# Patient Record
Sex: FEMALE | Race: OTHER | Employment: FULL TIME | URBAN - METROPOLITAN AREA
[De-identification: names, ages, dates, MRNs, and addresses within clinical notes are randomized per-mention and may not be internally consistent; named-entity substitution may affect disease eponyms.]

---

## 2017-08-01 ENCOUNTER — APPOINTMENT (OUTPATIENT)
Dept: CT IMAGING | Age: 32
End: 2017-08-01
Attending: PHYSICIAN ASSISTANT
Payer: OTHER GOVERNMENT

## 2017-08-01 ENCOUNTER — HOSPITAL ENCOUNTER (EMERGENCY)
Age: 32
Discharge: HOME | End: 2017-08-01
Attending: EMERGENCY MEDICINE
Payer: OTHER GOVERNMENT

## 2017-08-01 VITALS
WEIGHT: 126 LBS | BODY MASS INDEX: 22.32 KG/M2 | DIASTOLIC BLOOD PRESSURE: 104 MMHG | SYSTOLIC BLOOD PRESSURE: 163 MMHG | OXYGEN SATURATION: 100 % | HEIGHT: 63 IN | TEMPERATURE: 98.2 F | RESPIRATION RATE: 11 BRPM | HEART RATE: 64 BPM

## 2017-08-01 DIAGNOSIS — S09.90XA HEAD INJURY, INITIAL ENCOUNTER: Primary | ICD-10-CM

## 2017-08-01 DIAGNOSIS — I10 ESSENTIAL HYPERTENSION: ICD-10-CM

## 2017-08-01 DIAGNOSIS — M54.6 ACUTE BILATERAL THORACIC BACK PAIN: ICD-10-CM

## 2017-08-01 LAB
ALBUMIN SERPL BCP-MCNC: 3.5 G/DL (ref 3.5–5)
ALBUMIN/GLOB SERPL: 0.8 {RATIO} (ref 1.1–2.2)
ALP SERPL-CCNC: 99 U/L (ref 45–117)
ALT SERPL-CCNC: 38 U/L (ref 12–78)
ANION GAP BLD CALC-SCNC: 4 MMOL/L (ref 5–15)
APPEARANCE UR: CLEAR
AST SERPL W P-5'-P-CCNC: 40 U/L (ref 15–37)
ATRIAL RATE: 73 BPM
BACTERIA URNS QL MICRO: NEGATIVE /HPF
BASOPHILS # BLD AUTO: 0 K/UL (ref 0–0.1)
BASOPHILS # BLD: 0 % (ref 0–1)
BILIRUB SERPL-MCNC: 0.4 MG/DL (ref 0.2–1)
BILIRUB UR QL: NEGATIVE
BUN SERPL-MCNC: 9 MG/DL (ref 6–20)
BUN/CREAT SERPL: 12 (ref 12–20)
CALCIUM SERPL-MCNC: 8.4 MG/DL (ref 8.5–10.1)
CALCULATED P AXIS, ECG09: 53 DEGREES
CALCULATED R AXIS, ECG10: 20 DEGREES
CALCULATED T AXIS, ECG11: 6 DEGREES
CHLORIDE SERPL-SCNC: 104 MMOL/L (ref 97–108)
CO2 SERPL-SCNC: 29 MMOL/L (ref 21–32)
COLOR UR: NORMAL
CREAT SERPL-MCNC: 0.73 MG/DL (ref 0.55–1.02)
DIAGNOSIS, 93000: NORMAL
EOSINOPHIL # BLD: 0.2 K/UL (ref 0–0.4)
EOSINOPHIL NFR BLD: 2 % (ref 0–7)
EPITH CASTS URNS QL MICRO: NORMAL /LPF
ERYTHROCYTE [DISTWIDTH] IN BLOOD BY AUTOMATED COUNT: 12.7 % (ref 11.5–14.5)
GLOBULIN SER CALC-MCNC: 4.3 G/DL (ref 2–4)
GLUCOSE SERPL-MCNC: 86 MG/DL (ref 65–100)
GLUCOSE UR STRIP.AUTO-MCNC: NEGATIVE MG/DL
HCG UR QL: NEGATIVE
HCT VFR BLD AUTO: 40 % (ref 35–47)
HGB BLD-MCNC: 13.2 G/DL (ref 11.5–16)
HGB UR QL STRIP: NEGATIVE
HYALINE CASTS URNS QL MICRO: NORMAL /LPF (ref 0–5)
KETONES UR QL STRIP.AUTO: NEGATIVE MG/DL
LEUKOCYTE ESTERASE UR QL STRIP.AUTO: NEGATIVE
LYMPHOCYTES # BLD AUTO: 32 % (ref 12–49)
LYMPHOCYTES # BLD: 3.2 K/UL (ref 0.8–3.5)
MAGNESIUM SERPL-MCNC: 2.1 MG/DL (ref 1.6–2.4)
MCH RBC QN AUTO: 29.5 PG (ref 26–34)
MCHC RBC AUTO-ENTMCNC: 33 G/DL (ref 30–36.5)
MCV RBC AUTO: 89.5 FL (ref 80–99)
MONOCYTES # BLD: 0.7 K/UL (ref 0–1)
MONOCYTES NFR BLD AUTO: 7 % (ref 5–13)
NEUTS SEG # BLD: 5.7 K/UL (ref 1.8–8)
NEUTS SEG NFR BLD AUTO: 59 % (ref 32–75)
NITRITE UR QL STRIP.AUTO: NEGATIVE
P-R INTERVAL, ECG05: 138 MS
PH UR STRIP: 6.5 [PH] (ref 5–8)
PLATELET # BLD AUTO: 276 K/UL (ref 150–400)
POTASSIUM SERPL-SCNC: 4.1 MMOL/L (ref 3.5–5.1)
PROT SERPL-MCNC: 7.8 G/DL (ref 6.4–8.2)
PROT UR STRIP-MCNC: NEGATIVE MG/DL
Q-T INTERVAL, ECG07: 408 MS
QRS DURATION, ECG06: 78 MS
QTC CALCULATION (BEZET), ECG08: 449 MS
RBC # BLD AUTO: 4.47 M/UL (ref 3.8–5.2)
RBC #/AREA URNS HPF: NORMAL /HPF (ref 0–5)
SODIUM SERPL-SCNC: 137 MMOL/L (ref 136–145)
SP GR UR REFRACTOMETRY: 1.01 (ref 1–1.03)
UROBILINOGEN UR QL STRIP.AUTO: 0.2 EU/DL (ref 0.2–1)
VENTRICULAR RATE, ECG03: 73 BPM
WBC # BLD AUTO: 9.8 K/UL (ref 3.6–11)
WBC URNS QL MICRO: NORMAL /HPF (ref 0–4)

## 2017-08-01 PROCEDURE — 81001 URINALYSIS AUTO W/SCOPE: CPT | Performed by: PHYSICIAN ASSISTANT

## 2017-08-01 PROCEDURE — 96375 TX/PRO/DX INJ NEW DRUG ADDON: CPT

## 2017-08-01 PROCEDURE — 81025 URINE PREGNANCY TEST: CPT

## 2017-08-01 PROCEDURE — 36415 COLL VENOUS BLD VENIPUNCTURE: CPT | Performed by: PHYSICIAN ASSISTANT

## 2017-08-01 PROCEDURE — 99285 EMERGENCY DEPT VISIT HI MDM: CPT

## 2017-08-01 PROCEDURE — 85025 COMPLETE CBC W/AUTO DIFF WBC: CPT | Performed by: PHYSICIAN ASSISTANT

## 2017-08-01 PROCEDURE — 93005 ELECTROCARDIOGRAM TRACING: CPT

## 2017-08-01 PROCEDURE — 83735 ASSAY OF MAGNESIUM: CPT | Performed by: PHYSICIAN ASSISTANT

## 2017-08-01 PROCEDURE — 80053 COMPREHEN METABOLIC PANEL: CPT | Performed by: PHYSICIAN ASSISTANT

## 2017-08-01 PROCEDURE — 74011250636 HC RX REV CODE- 250/636: Performed by: PHYSICIAN ASSISTANT

## 2017-08-01 PROCEDURE — 96374 THER/PROPH/DIAG INJ IV PUSH: CPT

## 2017-08-01 PROCEDURE — 70450 CT HEAD/BRAIN W/O DYE: CPT

## 2017-08-01 RX ORDER — FLUTICASONE PROPIONATE 50 MCG
2 SPRAY, SUSPENSION (ML) NASAL DAILY
Qty: 1 BOTTLE | Refills: 0 | Status: SHIPPED | OUTPATIENT
Start: 2017-08-01

## 2017-08-01 RX ORDER — PROCHLORPERAZINE EDISYLATE 5 MG/ML
10 INJECTION INTRAMUSCULAR; INTRAVENOUS
Status: COMPLETED | OUTPATIENT
Start: 2017-08-01 | End: 2017-08-01

## 2017-08-01 RX ORDER — KETOROLAC TROMETHAMINE 30 MG/ML
15 INJECTION, SOLUTION INTRAMUSCULAR; INTRAVENOUS
Status: COMPLETED | OUTPATIENT
Start: 2017-08-01 | End: 2017-08-01

## 2017-08-01 RX ORDER — BUTALBITAL, ACETAMINOPHEN AND CAFFEINE 300; 40; 50 MG/1; MG/1; MG/1
1 CAPSULE ORAL
Qty: 12 CAP | Refills: 0 | Status: SHIPPED | OUTPATIENT
Start: 2017-08-01

## 2017-08-01 RX ADMIN — PROCHLORPERAZINE EDISYLATE 10 MG: 5 INJECTION INTRAMUSCULAR; INTRAVENOUS at 11:23

## 2017-08-01 RX ADMIN — KETOROLAC TROMETHAMINE 15 MG: 30 INJECTION, SOLUTION INTRAMUSCULAR at 13:26

## 2017-08-01 NOTE — LETTER
[address] 
[facility] EMERGENCY DEPT 
[address] [facility] [place] [id number] 
[phone] Work/School Note Date: 8/1/2017 To Whom It May concern: 
 
Martha Chicas was seen and treated today in the emergency room by the following provider(s): 
Attending Provider: Nina Ayoub. Svitlana Raman MD 
Physician Assistant: BRENDA Champion. Martha Cihcas may return to work on 8/4/17.  
 
Sincerely, 
 
 
 
 
BRENDA Champion

## 2017-08-01 NOTE — ED NOTES
Patient c/o htn, she went to [facility] for headache and right sided back pain and was sent her for further evaluation for htn. Pt with h/o htn and was on medication but is not taking medication now. Patient denies chest pain.

## 2017-08-01 NOTE — ED TRIAGE NOTES
Pt states she was seen at [facility] for headaches and mid back pain. Sent to ED to evaluate for elevated blood pressure.

## 2017-08-01 NOTE — DISCHARGE INSTRUCTIONS
Aprenda sobre el alivio del dolor de espalda - [ Learning About Relief for Back Pain ]  ¿Qué son la tensión y la distensión en la espalda? La distensión en la espalda ocurre cuando usted estira [place], o fuerza, un músculo de la espalda. Usted puede lesionarse la espalda en un accidente o cuando hace ejercicio o levanta algo. La mayoría de los negrito de espalda mejoran con reposo y con el tiempo. Usted puede cuidarse en el hogar para ayudar a que magaña espalda sane. ¿Qué es lo ashlee que puede hacer para aliviar el dolor de espalda? Cuando empiece a sentir dolor de espalda, siga estos pasos:  · Camine. Dé un paseo corto (10 a 20 minutos) sobre igor superficie plana (sin pendientes, donde no haya colinas o escaleras) cada 2 o 3 horas. Solo camine distancias que pueda manejar sin dolor, especialmente dolor en las piernas. · Relájese. Encuentre igor posición cómoda para descansar. Algunas personas se sienten cómodas en el suelo o en igor cama de firmeza media con igor almohada pequeña debajo de la chrystal y otra debajo de las rodillas. Otras prefieren acostarse de lado con igor almohada entre las rodillas. No permanezca en igor posición por [employer]. · Pruebe con calor o hielo. Trate de [employer] almohadilla térmica a baja o media temperatura, o tome igor ducha tibia de 15 o 20 minutos cada 2 o 3 horas. O puede comprar vendas térmicas desechables que se usan igor emerson vez por hasta 8 horas. También puede probar compresas de hielo entre 10 y 15 minutos cada 2 o 3 horas. Puede usar igor compresa de hielo o igor bolsa de verduras congeladas envuelta en igor toalla delgada. No existe evidencia sólida de que el calor o el hielo ayuden, oralia puede probarlos para jr si son de [place]. También podría convenirle probar alternar [employer] frío y el calor. · [employer] analgésicos (medicamentos para el dolor) exactamente según las indicaciones.   ¨ Si el médico le recetó un analgésico, tómelo según las indicaciones. ¨ Si no está tomando un analgésico recetado, pregúntele a magaña médico si puede tl nick de [employer]. ¿Qué más puede hacer? · Cheron Nam estiramientos y ejercicio. Los ejercicios que incrementan la flexibilidad pueden aliviar el dolor y facilitar que los músculos mantengan a la columna vertebral en igor buena posición neutra. Y no se olvide de seguir caminando. · Hágase masajes usted mismo. Usted puede darse masaje para relajarse después del trabajo o de la escuela o para sentirse lleno de energía en la mañana. No es difícil [place] [employer], las gris o el bartolome. El darse masaje usted mismo funciona mejor si está con ropa cómoda y sentado o [place] en igor posición cómoda. Utilice aceite o loción para masajearse la piel directamente. · Reduzca el estrés. El dolor de espalda puede llevar a un círculo guanakito: La angustia por el dolor tensa los músculos en la espalda, lo que a magaña vez causa más dolor. Aprenda a relajar la mente y los músculos para disminuir el estrés. ¿Dónde puede encontrar más información en inglés? [place] a http://trista-katrin.info/. Jose Mack [id number] en la búsqueda para aprender más acerca de \"Aprenda sobre el [place] del dolor de espalda - [ Learning About Relief for Back Pain ]. \"  Revisado: 21 marzo, 2017  Versión del contenido: 11.3  © 1970-7913 [employer], Incorporated. Las instrucciones de cuidado fueron adaptadas bajo licencia por [employer] (which disclaims liability or warranty for this information). Si usted tiene [place] afección médica o sobre estas instrucciones, siempre pregunte a magaña profesional de lynda. Healthwise, Incorporated niega toda garantía o responsabilidad por magaña uso de esta información. Estiramientos de la espalda: Ejercicios - [ Back Stretches: Exercises ]  Instrucciones de cuidado  Aquí se presentan algunos ejemplos de ejercicios para estiramiento de la espalda. Empiece cada ejercicio lentamente.  Reduzca la intensidad del ejercicio si [place] a tener dolor. Magaña médico o fisioterapeuta le dirán cuándo puede comenzar con estos ejercicios y cuáles funcionarán mejor para usted. Cómo hacer los ejercicios   Estiramiento sobre la chrystal    1. Párese cómodamente y separe los pies a la distancia de los hombros.  2. [place] adelante, levante ambos brazos sobre magaña [place] y trate de alcanzar el techo. No deje que la chrystal se incline [place] atrás. 3. Mantenga la posición anna 15 a 30 segundos y [employer] a los lados. 4. Repita de 2 a 4 veces. Estiramiento lateral    1. Párese cómodamente y separe los pies a la distancia de los hombros.  2. Norlene Lavaca un brazo sobre la chrystal y luego inclínese hacia el otro lado. 3. Deslice magaña mano por la pierna mientras tg que el peso de magaña brazo estire suavemente los músculos laterales. Mantenga la posición entre 15 y 27 segundos. 4. Repita de 2 a 4 veces de cada lado. [place]    1. Acuéstese boca abajo, apoyando magaña cuerpo con los antebrazos. 2. Alesia presión con los codos en el piso para elevar la espalda. Al hacer esto, relaje los músculos del estómago y permita que magaña espalda se arquee sin usar los músculos de magaña espalda. Al hacer presión, evite que rafat caderas o la pelvis se separen del piso. 3. Mantenga la posición anna 15 a 30 segundos y luego relájese. 4. Repita de 2 a 4 veces. Relajamiento y descanso    1. Acuéstese boca arriba con igor toalla enrollada debajo de magaña bartolome y [place] almohada debajo de las rodillas. Extienda los brazos cómodamente a los lados. 2. Relájese y respire con normalidad. 3. Permanezca en esta posición anna unos 10 minutos. 4. Si quiere, puede hacer [employer] 2 y 3 veces al día. La atención de seguimiento es igor parte clave de magaña tratamiento y seguridad. Asegúrese de hacer y acudir a todas las citas, y llame a magaña médico si está teniendo problemas.  También es igor buena idea saber los resultados de los exámenes y [employer] lista de los [place] james. ¿Dónde puede encontrar más información en inglés? Frantz File a http://trista-katrin.info/. Juantorito Ames A699 en la búsqueda para aprender más acerca de \"Estiramientos de la espalda: Ejercicios - [ Back Stretches: Exercises ]. \"  Revisado: 21 marzo, 2017  Versión del contenido: 11.3  © 2178-5236 Healthwise, Incorporated. Las instrucciones de cuidado fueron adaptadas bajo licencia por [employer] (which disclaims liability or warranty for this information). Si usted tiene [place] afección médica o sobre estas instrucciones, siempre pregunte a magaña profesional de lynda. Healthwise, Incorporated niega toda garantía o responsabilidad por magaña uso de esta información. Presión arterial tatiana: Instrucciones de cuidado - [ High Blood Pressure: Care Instructions ]  Instrucciones de cuidado  Si magaña presión arterial suele ser superior a 140/90, usted tiene presión arterial tatiana o hipertensión. [place] significa que el número de [place] es 140 o superior o que el número de abajo es 90 o superior, o ambas cosas. A pesar de lo que mucha gente cj, la hipertensión no suele causar dolor de chrystal ni provocar mareos o aturdimiento. Generalmente, no presenta síntomas. Sin embargo, aumenta el riesgo de ataque al corazón, ataque cerebral, y daños en los riñones o en los ojos. A mayor presión arterial, mayor riesgo. Magaña médico le dará igor meta para magaña presión arterial. Magaña meta se basará en magaña lynda y magaña edad. Un ejemplo de meta es mantener magaña presión arterial por debajo de 140/90. Los cambios de estilo de nancy, duarte alimentarse en forma saludable y [place], siempre son importantes para ayudarle a bajar la presión arterial. También podría tl medicamentos para lograr magaña meta para la presión arterial.  La atención de seguimiento es igor parte clave de magaña tratamiento y seguridad.  Asegúrese de hacer y acudir a todas las citas, y llame a magaña médico si está teniendo problemas. También es igor buena idea saber los resultados de rafat exámenes y mantener igor lista de los medicamentos que james. ¿Cómo puede cuidarse en el hogar? Tratamiento médico  · Si tg de tl rafat medicamentos, magaña presión arterial volverá a subir. Es posible que deba tl un tipo de [place], o más de [place], para reducir la presión arterial. Sea robert con los medicamentos. [place] magaña medicamento exactamente duarte se lo hayan indicado. Llame a magaña médico si cj estar teniendo problemas con magaña medicamento. · Hable con magaña médico antes de empezar a tl [employer]. La aspirina puede ayudar a determinadas personas a reducir magaña riesgo de tener un ataque cardíaco o un ataque cerebral. Ajay tl aspirina no es adecuado para todo el TRENGEREID, debido a que puede causar sangrado grave. · Visite a magaña médico periódicamente. Usted podría necesitar consultar a magaña médico con más frecuencia al principio o hasta que se reduzca magaña presión arterial.  · Si usted está tomando medicamentos para la presión arterial, hable con magaña médico antes de tl medicamentos descongestionantes o antiinflamatorios, duarte ibuprofeno. Algunos de estos medicamentos pueden elevar la presión arterial.  · Aprenda a revisarse la presión arterial en magaña hogar. Cambios en el estilo de nancy  · Mantenga un peso saludable. [place] es particularmente importante si aumenta de peso en la ginny de la cintura. Bajar solo 10 libras (4.5 kg) puede ayudarle a reducir magaña presión arterial.  · Alesia más ejercicios si magaña médico se lo recomienda. Caminar es igor buena opción. Poco a poco, aumente la distancia que [employer]. Intente hacer por lo menos 30 minutos de ejercicio la mayoría de los días de la [place]. También podría nadar, montar en bicicleta o hacer otras actividades. · No tome alcohol o limite la cantidad que oz. Hable con magaña médico acerca de si puede tl alcohol.   · Trate de limitar la cantidad de sodio que consume a menos de 2,300 miligramos (mg) al día. Magaña médico podría pedirle que trate de consumir menos de 1,500 mg al día. · Coma abundantes frutas (duarte bananas [plátanos] y naranjas), verduras, legumbres, granos integrales y productos lácteos de bajo contenido de [place]. · Reduzca la cantidad de grasas saturadas en magaña dieta. Los productos derivados de los [place], duarte la [place], el queso y la carne, contienen grasas saturadas. El limitar estos alimentos podría ayudarle a bajar de peso y [place] reducir magaña riesgo de igor enfermedad cardíaca. · No fume. El hábito de fumar aumenta magaña riesgo de ataque cerebral y ataque al corazón. Si necesita ayuda para dejar de fumar, hable con magaña médico sobre programas y medicamentos para dejar de fumar. Estos pueden aumentar rafat probabilidades de dejar el hábito para siempre. ¿Cuándo debe pedir ayuda? Llame al 911 en cualquier momento que considere que necesita atención de [place]. [place] puede significar que tiene síntomas que sugieren que magaña presión arterial le está causando un problema cardíaco o vascular grave. Magaña presión arterial podría ser superior a 180/110. Por ejemplo, llame al 911 si:  · Tiene síntomas de un ataque al corazón. Estos pueden incluir:  ¨ Dolor o presión en el pecho, o igor sensación extraña en el pecho. ¨ Sudoración. ¨ Falta de aire. ¨ Náuseas o vómito. ¨ Dolor, presión o igor sensación extraña en la espalda, el bartolome, la mandíbula, la parte superior del abdomen o en nick o ambos hombros o brazos. ¨ Aturdimiento o debilidad repentina. ¨ Latidos del corazón rápidos o irregulares. · Tiene síntomas de un ataque cerebral. Estos pueden incluir:  ¨ Entumecimiento, hormigueo, debilidad o parálisis repentinos en la krystyna, el brazo o la pierna, sobre todo en un solo lado del cuerpo. ¨ Cambios repentinos en la visión. ¨ Dificultades repentinas para hablar. ¨ Confusión repentina o dificultad súbita para comprender frases sencillas.   ¨ Problemas repentinos para caminar o mantener el equilibrio. ¨ Dolor de [place] intenso y repentino, distinto de los negrito de chrystal anteriores. · Tiene un dolor intenso en la espalda o el abdomen. No espere a que la presión arterial baje por sí emerson. Obtenga ayuda de inmediato. Llame a magaña médico ahora mismo o busque atención de inmediato si:  · Tiene la presión arterial mucho más tatiana de lo normal (duarte 180/110 o más tatiana), oralia no tiene síntomas. · Piensa que la presión arterial tatiana le está provocando síntomas, duarte:  ¨ Dolor de chrystal intenso. [address]. Vigile de cerca los [place], y asegúrese de comunicarse con magaña médico si:  · Magaña presión arterial mide 140/90 o más en al menos 2 ocasiones. [place] significa que el número de [place] es 140 o superior o el número de [facility] es 90 o superior, o ambas cosas. · Deya que podría estar teniendo efectos secundarios de los medicamentos para la presión arterial.  · Magaña presión arterial suele ser normal, oralia se eleva por encima de lo normal en al menos 2 ocasiones. ¿Dónde puede encontrar más información en inglés? Sherly Scrivener a http://trista-katrin.info/. Rolly Licona X663 en la búsqueda para aprender más acerca de \"Presión arterial tatiana: Instrucciones de cuidado - [ High Blood Pressure: Care Instructions ]. \"  Revisado: 8 agosto, 2016  Versión del contenido: 11.3  © 7081-4057 Healthwise, Incorporated. Las instrucciones de cuidado fueron adaptadas bajo licencia por [employer] (which disclaims liability or warranty for this information). Si usted tiene [place] afección médica o sobre estas instrucciones, siempre pregunte a magaña profesional de lynda. Healthwise, Incorporated niega toda garantía o responsabilidad por magaña uso de esta información. Headache: Care Instructions  Your Care Instructions    Headaches have many possible causes. Most headaches aren't a sign of a more serious problem, and they will get better on their own.  Home treatment may help you feel better faster. The doctor has checked you carefully, but problems can develop later. If you notice any problems or new symptoms, get medical treatment right away. Follow-up care is a key part of your treatment and safety. Be sure to make and go to all appointments, and call your doctor if you are having problems. It's also a good idea to know your test results and keep a list of the medicines you take. How can you care for yourself at home? · Do not drive if you have taken a prescription pain medicine. · Rest in a quiet, dark room until your headache is gone. Close your eyes and try to relax or go to sleep. Don't watch TV or read. · Put a cold, moist cloth or cold pack on the painful area for 10 to 20 minutes at a time. Put a thin cloth between the cold pack and your skin. · Use a warm, moist towel or a heating pad set on low to relax tight shoulder and neck muscles. · Have someone gently massage your neck and shoulders. · Take pain medicines exactly as directed. ¨ If the doctor gave you a prescription medicine for pain, take it as prescribed. ¨ If you are not taking a prescription pain medicine, ask your doctor if you can take an over-the-counter medicine. · Be careful not to take pain medicine more often than the instructions allow, because you may get worse or more frequent headaches when the medicine wears off. · Do not ignore new symptoms that occur with a headache, such as a fever, weakness or numbness, vision changes, or confusion. These may be signs of a more serious problem. To prevent headaches  · Keep a headache diary so you can figure out what triggers your headaches. Avoiding triggers may help you prevent headaches. Record when each headache began, how long it lasted, and what the pain was like (throbbing, aching, stabbing, or dull). Write down any other symptoms you had with the headache, such as nausea, flashing lights or dark spots, or sensitivity to bright light or loud noise.  Note if the headache occurred near your period. List anything that might have triggered the headache, such as certain foods (chocolate, cheese, wine) or odors, smoke, bright light, stress, or lack of sleep. · Find healthy ways to deal with stress. Headaches are most common during or right after stressful times. Take time to relax before and after you do something that has caused a headache in the past.  · Try to keep your muscles relaxed by keeping good posture. Check your jaw, face, neck, and shoulder muscles for tension, and try relaxing them. When sitting at a desk, change positions often, and stretch for 30 seconds each hour. · Get plenty of sleep and exercise. · Eat regularly and well. Long periods without food can trigger a headache. · Treat yourself to a massage. Some people find that regular massages are very helpful in relieving tension. · Limit caffeine by not drinking too much coffee, tea, or soda. But don't quit caffeine suddenly, because that can also give you headaches. · Reduce eyestrain from computers by blinking frequently and looking away from the computer screen every so often. Make sure you have proper eyewear and that your monitor is set up properly, about an arm's length away. · Seek help if you have depression or anxiety. Your headaches may be linked to these conditions. Treatment can both prevent headaches and help with symptoms of anxiety or depression. When should you call for help? Call 911 anytime you think you may need emergency care. For example, call if:  · You have signs of a stroke. These may include:  ¨ Sudden numbness, paralysis, or weakness in your face, arm, or leg, especially on only one side of your body. ¨ Sudden vision changes. ¨ Sudden trouble speaking. ¨ Sudden confusion or trouble understanding simple statements. ¨ Sudden problems with walking or balance. ¨ A sudden, severe headache that is different from past headaches.   Call your doctor now or seek immediate medical care if:  · You have a new or worse headache. · Your headache gets much worse. Where can you learn more? Go to http://trista-katrin.info/. Enter M271 in the search box to learn more about \"Headache: Care Instructions. \"  Current as of: October 14, 2016  Content Version: 11.3  © 2386-6769 fotopedia. Care instructions adapted under license by [employer] (which disclaims liability or warranty for this information). If you have questions about a medical condition or this instruction, always ask your healthcare professional. [place] [address] any warranty or liability for your use of this information. We hope that we have addressed all of your medical concerns. The examination and treatment you received in the Emergency Department were for an emergent problem and were not intended as complete care. It is important that you follow up with your healthcare provider(s) for ongoing care. If your symptoms worsen or do not improve as expected, and you are unable to reach your usual health care provider(s), you should return to the Emergency Department. Today's healthcare is undergoing tremendous change, and patient satisfaction surveys are one of the many tools to assess the quality of medical care. You may receive a survey from the [employer] regarding your experience in the Emergency Department. I hope that your experience has been completely positive, particularly the medical care that I provided. As such, please participate in the survey; anything less than excellent does not meet my expectations or intentions. [facility] and [address] [place] [address] participate in nationally recognized quality of care measures.   If your blood pressure is greater than 120/80, as reported below, we urge that you seek medical care to address the potential of high blood pressure, commonly known as hypertension. Hypertension can be hereditary or can be caused by certain medical conditions, pain, stress, or \"white coat syndrome. \"       Please make an appointment with your health care provider(s) for follow up of your Emergency Department visit. VITALS:   Patient Vitals for the past 8 hrs:   Temp Pulse Resp BP SpO2   08/01/17 1415 - 66 16 (!) 155/94 99 %   08/01/17 1400 - 67 16 (!) 154/99 98 %   08/01/17 1345 - 67 14 (!) 159/97 98 %   08/01/17 1330 - 65 15 (!) 158/98 98 %   08/01/17 1315 - 63 15 (!) 159/103 98 %   08/01/17 1300 - 72 15 (!) 156/102 99 %   08/01/17 1245 - 73 12 (!) 167/104 100 %   08/01/17 1230 - 71 14 (!) 163/100 99 %   08/01/17 1215 - 75 15 168/90 100 %   08/01/17 1200 - 73 15 (!) 159/95 99 %   08/01/17 1155 - 83 13 (!) 174/103 100 %   08/01/17 1144 - 72 12 - 99 %   08/01/17 1115 - 76 12 (!) 174/102 100 %   08/01/17 1109 - 79 13 (!) 167/102 100 %   08/01/17 1015 - 73 14 (!) 173/95 100 %   08/01/17 1009 - - - (!) 172/116 -   08/01/17 0953 98.2 °F (36.8 °C) 72 12 (!) 178/107 99 %          Thank you for allowing us to provide you with medical care today. We realize that you have many choices for your emergency care needs. Please choose us in the future for any continued health care needs. Jyoti Anthony, [address] [place] [address].   Office: [phone]            Recent Results (from the past 24 hour(s))   CBC WITH AUTOMATED DIFF    Collection Time: 08/01/17 10:32 AM   Result Value Ref Range    WBC 9.8 3.6 - 11.0 K/uL    RBC 4.47 3.80 - 5.20 M/uL    HGB 13.2 11.5 - 16.0 g/dL    HCT 40.0 35.0 - 47.0 %    MCV 89.5 80.0 - 99.0 FL    MCH 29.5 26.0 - 34.0 PG    MCHC 33.0 30.0 - 36.5 g/dL    RDW 12.7 11.5 - 14.5 %    PLATELET 421 690 - 181 K/uL    NEUTROPHILS 59 32 - 75 %    LYMPHOCYTES 32 12 - 49 %    MONOCYTES 7 5 - 13 %    EOSINOPHILS 2 0 - 7 %    BASOPHILS 0 0 - 1 %    ABS. NEUTROPHILS 5.7 1.8 - 8.0 K/UL    ABS. LYMPHOCYTES 3.2 0.8 - 3.5 K/UL    ABS. MONOCYTES 0.7 0.0 - 1.0 K/UL    ABS. EOSINOPHILS 0.2 0.0 - 0.4 K/UL    ABS. BASOPHILS 0.0 0.0 - 0.1 K/UL   METABOLIC PANEL, COMPREHENSIVE    Collection Time: 08/01/17 10:32 AM   Result Value Ref Range    Sodium 137 136 - 145 mmol/L    Potassium 4.1 3.5 - 5.1 mmol/L    Chloride 104 97 - 108 mmol/L    CO2 29 21 - 32 mmol/L    Anion gap 4 (L) 5 - 15 mmol/L    Glucose 86 65 - 100 mg/dL    BUN 9 6 - 20 MG/DL    Creatinine 0.73 0.55 - 1.02 MG/DL    BUN/Creatinine ratio 12 12 - 20      GFR est AA >60 >60 ml/min/1.73m2    GFR est non-AA >60 >60 ml/min/1.73m2    Calcium 8.4 (L) 8.5 - 10.1 MG/DL    Bilirubin, total 0.4 0.2 - 1.0 MG/DL    ALT (SGPT) 38 12 - 78 U/L    AST (SGOT) 40 (H) 15 - 37 U/L    Alk.  phosphatase 99 45 - 117 U/L    Protein, total 7.8 6.4 - 8.2 g/dL    Albumin 3.5 3.5 - 5.0 g/dL    Globulin 4.3 (H) 2.0 - 4.0 g/dL    A-G Ratio 0.8 (L) 1.1 - 2.2     MAGNESIUM    Collection Time: 08/01/17 10:32 AM   Result Value Ref Range    Magnesium 2.1 1.6 - 2.4 mg/dL   URINALYSIS W/MICROSCOPIC    Collection Time: 08/01/17 11:00 AM   Result Value Ref Range    Color YELLOW/STRAW      Appearance CLEAR CLEAR      Specific gravity 1.014 1.003 - 1.030      pH (UA) 6.5 5.0 - 8.0      Protein NEGATIVE  NEG mg/dL    Glucose NEGATIVE  NEG mg/dL    Ketone NEGATIVE  NEG mg/dL    Bilirubin NEGATIVE  NEG      Blood NEGATIVE  NEG      Urobilinogen 0.2 0.2 - 1.0 EU/dL    Nitrites NEGATIVE  NEG      Leukocyte Esterase NEGATIVE  NEG      WBC 0-4 0 - 4 /hpf    RBC 0-5 0 - 5 /hpf    Epithelial cells FEW FEW /lpf    Bacteria NEGATIVE  NEG /hpf    Hyaline cast 0-2 0 - 5 /lpf   EKG, 12 LEAD, INITIAL    Collection Time: 08/01/17 11:08 AM   Result Value Ref Range    Ventricular Rate 73 BPM    Atrial Rate 73 BPM    P-R Interval 138 ms    QRS Duration 78 ms    Q-T Interval 408 ms    QTC Calculation (Bezet) 449 ms    Calculated P Axis 53 degrees    Calculated R Axis 20 degrees    Calculated T Axis 6 degrees    Diagnosis       Normal sinus rhythm  Possible Left atrial enlargement  Nonspecific T wave abnormality  Abnormal ECG  No previous ECGs available  Confirmed by Brandi Soliman MD., Rae ([id number]) on 8/1/2017 1:53:46 PM     HCG URINE, QL. - POC    Collection Time: 08/01/17 11:11 AM   Result Value Ref Range    Pregnancy test,urine (POC) NEGATIVE  NEG         Ct Head Wo Cont    Result Date: 8/1/2017  INDICATION:  Headache and elevated blood pressure. EXAM Multiple contiguous helically acquired images were obtained through the brain without intravenous contrast administration. Sagittal and coronal reconstructions were performed. CT dose reduction was achieved through the use of a standardized protocol tailored for this examination and automatic exposure control for dose modulation. FINDINGS: Comparison study:  None are available. Images through the brain reveal no confluent infarct or intracranial mass or shift of midline structures. There is no intracranial hemorrhage. Ventricles are normal in size in appearance. Bone windows demonstrate no depressed skull fracture. IMPRESSION: Unremarkable Non-Contrast Cranial CT.

## 2017-08-01 NOTE — ED PROVIDER NOTES
HPI Comments: Segundo Finch is a 27 y.o. Female with past medical history significant for HTN, who presents from [facility] to the ED with a c/o HA x 3 days, and for evaluation of elevated BP today at [facility]. Pt states that she has been experiencing a right sided HA for the past three days, which has been progressively worsening in severity and has been unrelieved with Tylenol or Motrin at home. She additionally c/o recent nasal congestion, and states that yesterday she felt the onset of diffuse mid-back pain. There was no injury or trauma to explain the onset of her back pain, and she notes that the back pain is also unrelieved with Motrin/Tylenol. Pt states her back pain is constant, \"aching\", and ranks her current discomfort as a 6/10 in severity. This morning, pt states that she took Dayquil at home for tx of the congestion, and then went to [facility] for further evaluation of her HA and back pain. She notes that her BP is generally elevated at baseline (160s/80s), and she states that she used to take medications but was then taken off all antihypertensive therapy. While at [facility], pt's BP was found to be 162/108 mmHg in triage, and then 160/120 mmHg during recheck. She was referred to the ED for further evaluation of her BP, pain, and to rule out a brain bleed. She specifically denies any fevers, chills, nausea, vomiting, diarrhea, abdominal pain, urinary sx, CP, and SOB. Of note, pt states that her LMP was a couple weeks ago. There are no other acute medical concerns at this time. PCP: None  PMHx significant for: HTN  PSHx significant for: None  Social Hx: Tobacco: Never Smoker, EtOH: None    Note written by Antoine Paul, as dictated by BRENDA Ariza 10:05 AM       The history is provided by the patient, the spouse and medical records ([facility]). No  was used.         Past Medical History:   Diagnosis Date    Hypertension        No past surgical history on file. No family history on file. Social History     Social History    Marital status:      Spouse name: N/A    Number of children: N/A    Years of education: N/A     Occupational History    Not on file. Social History Main Topics    Smoking status: Never Smoker    Smokeless tobacco: Not on file    Alcohol use No    Drug use: Not on file    Sexual activity: Not on file     Other Topics Concern    Not on file     Social History Narrative    No narrative on file         ALLERGIES: Review of patient's allergies indicates no known allergies. Review of Systems   Constitutional: Negative for appetite change, chills, fatigue and fever. HENT: Positive for congestion. Negative for ear pain, postnasal drip, rhinorrhea and sore throat. Eyes: Negative for visual disturbance. Respiratory: Negative for cough, shortness of breath and wheezing. Cardiovascular: Negative for chest pain, palpitations and leg swelling. Gastrointestinal: Negative for abdominal pain, anal bleeding, constipation, diarrhea, nausea and vomiting. Genitourinary: Negative for dysuria and hematuria. Musculoskeletal: Positive for back pain. Skin: Negative for rash. Allergic/Immunologic: Negative for immunocompromised state. Neurological: Positive for headaches. Negative for weakness and light-headedness.        Patient Vitals for the past 12 hrs:   Temp Pulse Resp BP SpO2   08/01/17 1445 - 64 11 (!) 163/104 100 %   08/01/17 1430 - 73 12 (!) 158/99 99 %   08/01/17 1415 - 66 16 (!) 155/94 99 %   08/01/17 1400 - 67 16 (!) 154/99 98 %   08/01/17 1345 - 67 14 (!) 159/97 98 %   08/01/17 1330 - 65 15 (!) 158/98 98 %   08/01/17 1315 - 63 15 (!) 159/103 98 %   08/01/17 1300 - 72 15 (!) 156/102 99 %   08/01/17 1245 - 73 12 (!) 167/104 100 %   08/01/17 1230 - 71 14 (!) 163/100 99 %   08/01/17 1215 - 75 15 168/90 100 %   08/01/17 1200 - 73 15 (!) 159/95 99 %   08/01/17 1155 - 83 13 (!) 174/103 100 %   08/01/17 1144 - 72 12 - 99 %   08/01/17 1115 - 76 12 (!) 174/102 100 %   08/01/17 1109 - 79 13 (!) 167/102 100 %   08/01/17 1015 - 73 14 (!) 173/95 100 %   08/01/17 1009 - - - (!) 172/116 -   08/01/17 0953 98.2 °F (36.8 °C) 72 12 (!) 178/107 99 %              Physical Exam   Constitutional: She is oriented to person, place, and time. She appears well-developed and well-nourished. No distress. HENT:   Head: Normocephalic and atraumatic. Right Ear: External ear normal.   Left Ear: External ear normal.   Mouth/Throat: Oropharynx is clear and moist.   Erythematous boggy nares + clear rhinorrhea +PND   Eyes: EOM are normal. Pupils are equal, round, and reactive to light. Right eye exhibits no discharge. Left eye exhibits no discharge. Neck: Neck supple. No JVD present. No tracheal deviation present. No nuchal rigidity or meningismus   Cardiovascular: Normal rate, regular rhythm, normal heart sounds and intact distal pulses. Exam reveals no gallop and no friction rub. No murmur heard. Pulmonary/Chest: Effort normal and breath sounds normal. No stridor. No respiratory distress. She has no wheezes. She has no rales. She exhibits no tenderness. Abdominal: Soft. Bowel sounds are normal. She exhibits no distension and no mass. There is no tenderness. There is no rebound and no guarding. Musculoskeletal: Normal range of motion. She exhibits no edema, tenderness or deformity. Back diffuse bilateral flank TTP without midline vertebral TTP no swelling or step off. No discoloration. No deformity or lesions. Neg SLR neg EHL neg VAISHNAVI. Ambulates without assistance. Distal n/v intact. Cap refill brisk   Lymphadenopathy:     She has no cervical adenopathy. Neurological: She is alert and oriented to person, place, and time. No cranial nerve deficit. Coordination normal.   Skin: No rash noted. No erythema. No pallor. Psychiatric: She has a normal mood and affect.  Her behavior is normal.   Nursing note and vitals reviewed. MDM  Number of Diagnoses or Management Options  Acute bilateral thoracic back pain:   Essential hypertension:   Head injury, initial encounter:      Amount and/or Complexity of Data Reviewed  Clinical lab tests: ordered and reviewed  Tests in the radiology section of CPT®: ordered and reviewed  Tests in the medicine section of CPT®: reviewed and ordered  Obtain history from someone other than the patient: yes ()  Review and summarize past medical records: yes  Independent visualization of images, tracings, or specimens: yes    Patient Progress  Patient progress: stable    ED Course       Procedures     10:16 AM  Discussed pt, sx, hx and current findings with Ricky Zaman. Wil Lutz MD. He is in agreement with plan will see pt  Alena Anthony PA-C      ED EKG interpretation: 11:08 AM  Rhythm: normal sinus rhythm; and regular . Rate (approx.): 73; Axis: normal; P wave: normal; QRS interval: normal ; ST/T wave: non-specific T changes; Other findings: left atrial enlargement, no STEMI, abnormal ekg. This EKG was interpreted by Ricky Zaman. Wil Lutz MD,ED Provider. 12:45 PM   Ct neg. Will give toradol for sx. bp improving  Alena Anthony PA-C  2:42 PM   sx abated. bp improved. Will tx headache and congestion. Pt to follow up with pcp for bp management. Alena Anthony PA-C    LABORATORY TESTS:  Recent Results (from the past 12 hour(s))   CBC WITH AUTOMATED DIFF    Collection Time: 08/01/17 10:32 AM   Result Value Ref Range    WBC 9.8 3.6 - 11.0 K/uL    RBC 4.47 3.80 - 5.20 M/uL    HGB 13.2 11.5 - 16.0 g/dL    HCT 40.0 35.0 - 47.0 %    MCV 89.5 80.0 - 99.0 FL    MCH 29.5 26.0 - 34.0 PG    MCHC 33.0 30.0 - 36.5 g/dL    RDW 12.7 11.5 - 14.5 %    PLATELET 685 228 - 802 K/uL    NEUTROPHILS 59 32 - 75 %    LYMPHOCYTES 32 12 - 49 %    MONOCYTES 7 5 - 13 %    EOSINOPHILS 2 0 - 7 %    BASOPHILS 0 0 - 1 %    ABS. NEUTROPHILS 5.7 1.8 - 8.0 K/UL    ABS. LYMPHOCYTES 3.2 0.8 - 3.5 K/UL    ABS. MONOCYTES 0.7 0.0 - 1.0 K/UL    ABS. EOSINOPHILS 0.2 0.0 - 0.4 K/UL    ABS. BASOPHILS 0.0 0.0 - 0.1 K/UL   METABOLIC PANEL, COMPREHENSIVE    Collection Time: 08/01/17 10:32 AM   Result Value Ref Range    Sodium 137 136 - 145 mmol/L    Potassium 4.1 3.5 - 5.1 mmol/L    Chloride 104 97 - 108 mmol/L    CO2 29 21 - 32 mmol/L    Anion gap 4 (L) 5 - 15 mmol/L    Glucose 86 65 - 100 mg/dL    BUN 9 6 - 20 MG/DL    Creatinine 0.73 0.55 - 1.02 MG/DL    BUN/Creatinine ratio 12 12 - 20      GFR est AA >60 >60 ml/min/1.73m2    GFR est non-AA >60 >60 ml/min/1.73m2    Calcium 8.4 (L) 8.5 - 10.1 MG/DL    Bilirubin, total 0.4 0.2 - 1.0 MG/DL    ALT (SGPT) 38 12 - 78 U/L    AST (SGOT) 40 (H) 15 - 37 U/L    Alk.  phosphatase 99 45 - 117 U/L    Protein, total 7.8 6.4 - 8.2 g/dL    Albumin 3.5 3.5 - 5.0 g/dL    Globulin 4.3 (H) 2.0 - 4.0 g/dL    A-G Ratio 0.8 (L) 1.1 - 2.2     MAGNESIUM    Collection Time: 08/01/17 10:32 AM   Result Value Ref Range    Magnesium 2.1 1.6 - 2.4 mg/dL   URINALYSIS W/MICROSCOPIC    Collection Time: 08/01/17 11:00 AM   Result Value Ref Range    Color YELLOW/STRAW      Appearance CLEAR CLEAR      Specific gravity 1.014 1.003 - 1.030      pH (UA) 6.5 5.0 - 8.0      Protein NEGATIVE  NEG mg/dL    Glucose NEGATIVE  NEG mg/dL    Ketone NEGATIVE  NEG mg/dL    Bilirubin NEGATIVE  NEG      Blood NEGATIVE  NEG      Urobilinogen 0.2 0.2 - 1.0 EU/dL    Nitrites NEGATIVE  NEG      Leukocyte Esterase NEGATIVE  NEG      WBC 0-4 0 - 4 /hpf    RBC 0-5 0 - 5 /hpf    Epithelial cells FEW FEW /lpf    Bacteria NEGATIVE  NEG /hpf    Hyaline cast 0-2 0 - 5 /lpf   EKG, 12 LEAD, INITIAL    Collection Time: 08/01/17 11:08 AM   Result Value Ref Range    Ventricular Rate 73 BPM    Atrial Rate 73 BPM    P-R Interval 138 ms    QRS Duration 78 ms    Q-T Interval 408 ms    QTC Calculation (Bezet) 449 ms    Calculated P Axis 53 degrees    Calculated R Axis 20 degrees    Calculated T Axis 6 degrees    Diagnosis       Normal sinus rhythm  Possible Left atrial enlargement  Nonspecific T wave abnormality  Abnormal ECG  No previous ECGs available  Confirmed by Bradley Arce MD., Anca Dickson ([id number]) on 8/1/2017 1:53:46 PM     HCG URINE, QL. - POC    Collection Time: 08/01/17 11:11 AM   Result Value Ref Range    Pregnancy test,urine (POC) NEGATIVE  NEG         IMAGING RESULTS:  CT HEAD WO CONT   Final Result          INDICATION:  Headache and elevated blood pressure.     EXAM Multiple contiguous helically acquired images were obtained through the  brain without intravenous contrast administration. Sagittal and coronal  reconstructions were performed.     CT dose reduction was achieved through the use of a standardized protocol  tailored for this examination and automatic exposure control for dose  modulation.     FINDINGS:      Comparison study:  None are available.     Images through the brain reveal no confluent infarct or intracranial mass or  shift of midline structures. There is no intracranial hemorrhage. Ventricles are  normal in size in appearance.     Bone windows demonstrate no depressed skull fracture.     IMPRESSION: Unremarkable Non-Contrast Cranial CT.                MEDICATIONS GIVEN:  Medications   prochlorperazine (COMPAZINE) injection 10 mg (10 mg IntraVENous Given 8/1/17 1123)   ketorolac (TORADOL) injection 15 mg (15 mg IntraVENous Given 8/1/17 1326)       IMPRESSION:  1. Head injury, initial encounter    2. Acute bilateral thoracic back pain    3. Essential hypertension        PLAN:  1. Discharge Medication List as of 8/1/2017  2:41 PM      START taking these medications    Details   butalbital-acetaminophen-caff (FIORICET) -40 mg per capsule Take 1 Cap by mouth every six (6) hours as needed for Pain. Max Daily Amount: 4 Caps., Print, Disp-12 Cap, R-0      fluticasone (FLONASE) 50 mcg/actuation nasal spray 2 Sprays by Both Nostrils route daily. , Print, Disp-1 Bottle, R-0         CONTINUE these medications which have NOT CHANGED Details   prenatal vit-iron fumarate-fa 27-0.8 mg tab tablet Take 1 Tab by mouth daily. , Print, Disp-100 Tab, R-4           2. Follow-up Information     Follow up With Details Comments [facility] Schedule an appointment as soon as possible for a visit 2-4 days for recheck [address]  [phone]        Return to ED if worse   2:42 PM  Pt has been reexamined. Pt has no new complaints, changes or physical findings. Care plan outlined and precautions discussed. All available results were reviewed with pt. All medications were reviewed with pt. All of pt's questions and concerns were addressed. Pt agrees to F/U as instructed and agrees to return to ED upon further deterioration. Pt is ready to go home.   BRENDA Mendoza

## 2017-08-04 ENCOUNTER — OFFICE VISIT (OUTPATIENT)
Dept: FAMILY MEDICINE CLINIC | Age: 32
End: 2017-08-04

## 2017-08-04 VITALS
HEIGHT: 63 IN | DIASTOLIC BLOOD PRESSURE: 100 MMHG | HEART RATE: 86 BPM | WEIGHT: 127 LBS | RESPIRATION RATE: 18 BRPM | TEMPERATURE: 97.8 F | OXYGEN SATURATION: 98 % | SYSTOLIC BLOOD PRESSURE: 156 MMHG | BODY MASS INDEX: 22.5 KG/M2

## 2017-08-04 DIAGNOSIS — Z13.1 SCREENING FOR DIABETES MELLITUS: ICD-10-CM

## 2017-08-04 DIAGNOSIS — I10 HTN (HYPERTENSION), BENIGN: Primary | ICD-10-CM

## 2017-08-04 RX ORDER — LISINOPRIL 10 MG/1
10 TABLET ORAL DAILY
Qty: 90 TAB | Refills: 3 | Status: SHIPPED | OUTPATIENT
Start: 2017-08-04 | End: 2018-10-01

## 2017-08-04 RX ORDER — AMLODIPINE BESYLATE 5 MG/1
5 TABLET ORAL DAILY
Qty: 90 TAB | Refills: 3 | Status: SHIPPED | OUTPATIENT
Start: 2017-08-04 | End: 2018-10-01

## 2017-08-04 NOTE — PATIENT INSTRUCTIONS

## 2017-08-04 NOTE — PROGRESS NOTES
Visit Vitals    BP (!) 156/100    Pulse 86    Temp 97.8 °F (36.6 °C) (Oral)    Resp 18    Ht 5' 3\" (1.6 m)    Wt 127 lb (57.6 kg)    LMP 07/25/2017    SpO2 98%    BMI 22.5 kg/m2     Chief Complaint   Patient presents with    Hypertension

## 2017-08-04 NOTE — PROGRESS NOTES
Subjective:     Maite Javier is a 27 y.o. female who presents for follow up of elevated BP    New concerns: [facility]. Bp: 160/120, and was sent to ED 3 days ago. Patient was given Toradol and BP improved. CT head neg. Was also seen in [facility] 2 years ago for elevated BP:`160-170  No complaints today. Patient Active Problem List   Diagnosis Code    Kidney stone N20.0    HTN (hypertension), benign I10    Nephrolithiasis N20.0       Current Outpatient Prescriptions   Medication Sig    amLODIPine (NORVASC) 5 mg tablet Take 1 Tab by mouth daily.  lisinopril (PRINIVIL, ZESTRIL) 10 mg tablet Take 1 Tab by mouth daily.  butalbital-acetaminophen-caff (FIORICET) -40 mg per capsule Take 1 Cap by mouth every six (6) hours as needed for Pain. Max Daily Amount: 4 Caps.  fluticasone (FLONASE) 50 mcg/actuation nasal spray 2 Sprays by Both Nostrils route daily.  prenatal vit-iron fumarate-fa 27-0.8 mg tab tablet Take 1 Tab by mouth daily. No current facility-administered medications for this visit. No Known Allergies    Diet and Lifestyle: generally follows a low sodium diet      BP readings outside office:  140-160s    Cardiovascular ROS: no TIA's, no chest pain on exertion, no dyspnea on exertion, no swelling of ankles. Review of Systems, additional:No change in vision, no numbness,no weakness,no headaches  Pertinent items are noted in HPI. Past Medical History:   Diagnosis Date    Hypertension      History reviewed. No pertinent surgical history. History reviewed. No pertinent family history.   Social History   Substance Use Topics    Smoking status: Never Smoker    Smokeless tobacco: Never Used    Alcohol use No          Objective:     Visit Vitals    BP (!) 156/100    Pulse 86    Temp 97.8 °F (36.6 °C) (Oral)    Resp 18    Ht 5' 3\" (1.6 m)    Wt 127 lb (57.6 kg)    LMP 07/25/2017    SpO2 98%    BMI 22.5 kg/m2 Body mass index is 22.5 kg/(m^2). Physical Exam  General appearance: alert, cooperative, no distress, appears stated age  Eyes: fundus normal  Lungs: clear to auscultation bilaterally, no wheezes, no increased work of breathing  Heart: regular rate and rhythm, S1, S2 normal, no murmur, click, rub or gallop  Extremities: extremities normal, no cyanosis or edema  Skin: color, texture, turgor normal. No rashes or lesions  Neurologic: Alert and oriented, grossly normal exam. Normal coordination and gait      Lab results below reviewed at this visit:  Results for orders placed or performed during the hospital encounter of 08/01/17   CBC WITH AUTOMATED DIFF   Result Value Ref Range    WBC 9.8 3.6 - 11.0 K/uL    RBC 4.47 3.80 - 5.20 M/uL    HGB 13.2 11.5 - 16.0 g/dL    HCT 40.0 35.0 - 47.0 %    MCV 89.5 80.0 - 99.0 FL    MCH 29.5 26.0 - 34.0 PG    MCHC 33.0 30.0 - 36.5 g/dL    RDW 12.7 11.5 - 14.5 %    PLATELET 012 527 - 951 K/uL    NEUTROPHILS 59 32 - 75 %    LYMPHOCYTES 32 12 - 49 %    MONOCYTES 7 5 - 13 %    EOSINOPHILS 2 0 - 7 %    BASOPHILS 0 0 - 1 %    ABS. NEUTROPHILS 5.7 1.8 - 8.0 K/UL    ABS. LYMPHOCYTES 3.2 0.8 - 3.5 K/UL    ABS. MONOCYTES 0.7 0.0 - 1.0 K/UL    ABS. EOSINOPHILS 0.2 0.0 - 0.4 K/UL    ABS. BASOPHILS 0.0 0.0 - 0.1 K/UL   METABOLIC PANEL, COMPREHENSIVE   Result Value Ref Range    Sodium 137 136 - 145 mmol/L    Potassium 4.1 3.5 - 5.1 mmol/L    Chloride 104 97 - 108 mmol/L    CO2 29 21 - 32 mmol/L    Anion gap 4 (L) 5 - 15 mmol/L    Glucose 86 65 - 100 mg/dL    BUN 9 6 - 20 MG/DL    Creatinine 0.73 0.55 - 1.02 MG/DL    BUN/Creatinine ratio 12 12 - 20      GFR est AA >60 >60 ml/min/1.73m2    GFR est non-AA >60 >60 ml/min/1.73m2    Calcium 8.4 (L) 8.5 - 10.1 MG/DL    Bilirubin, total 0.4 0.2 - 1.0 MG/DL    ALT (SGPT) 38 12 - 78 U/L    AST (SGOT) 40 (H) 15 - 37 U/L    Alk.  phosphatase 99 45 - 117 U/L    Protein, total 7.8 6.4 - 8.2 g/dL    Albumin 3.5 3.5 - 5.0 g/dL    Globulin 4.3 (H) 2.0 - 4.0 g/dL A-G Ratio 0.8 (L) 1.1 - 2.2     MAGNESIUM   Result Value Ref Range    Magnesium 2.1 1.6 - 2.4 mg/dL   URINALYSIS W/MICROSCOPIC   Result Value Ref Range    Color YELLOW/STRAW      Appearance CLEAR CLEAR      Specific gravity 1.014 1.003 - 1.030      pH (UA) 6.5 5.0 - 8.0      Protein NEGATIVE  NEG mg/dL    Glucose NEGATIVE  NEG mg/dL    Ketone NEGATIVE  NEG mg/dL    Bilirubin NEGATIVE  NEG      Blood NEGATIVE  NEG      Urobilinogen 0.2 0.2 - 1.0 EU/dL    Nitrites NEGATIVE  NEG      Leukocyte Esterase NEGATIVE  NEG      WBC 0-4 0 - 4 /hpf    RBC 0-5 0 - 5 /hpf    Epithelial cells FEW FEW /lpf    Bacteria NEGATIVE  NEG /hpf    Hyaline cast 0-2 0 - 5 /lpf   HCG URINE, QL. - POC   Result Value Ref Range    Pregnancy test,urine (POC) NEGATIVE  NEG     EKG, 12 LEAD, INITIAL   Result Value Ref Range    Ventricular Rate 73 BPM    Atrial Rate 73 BPM    P-R Interval 138 ms    QRS Duration 78 ms    Q-T Interval 408 ms    QTC Calculation (Bezet) 449 ms    Calculated P Axis 53 degrees    Calculated R Axis 20 degrees    Calculated T Axis 6 degrees    Diagnosis       Normal sinus rhythm  Possible Left atrial enlargement  Nonspecific T wave abnormality  Abnormal ECG  No previous ECGs available  Confirmed by Navneet Camara MD., Rae ([id number]) on 8/1/2017 1:53:46 PM         Lab Results   Component Value Date/Time    Sodium 137 08/01/2017 10:32 AM    Potassium 4.1 08/01/2017 10:32 AM    Chloride 104 08/01/2017 10:32 AM    CO2 29 08/01/2017 10:32 AM    Anion gap 4 08/01/2017 10:32 AM    Glucose 86 08/01/2017 10:32 AM    BUN 9 08/01/2017 10:32 AM    Creatinine 0.73 08/01/2017 10:32 AM    BUN/Creatinine ratio 12 08/01/2017 10:32 AM    GFR est AA >60 08/01/2017 10:32 AM    GFR est non-AA >60 08/01/2017 10:32 AM    Calcium 8.4 08/01/2017 10:32 AM   UA was normal        Assessment/Plan:       ICD-10-CM ICD-9-CM    1.  HTN (hypertension), benign I10 401.1 LIPID PANEL      AMB POC URINE, MICROALBUMIN, SEMIQUANT (3 RESULTS)      HEMOGLOBIN A1C WITH EAG amLODIPine (NORVASC) 5 mg tablet      lisinopril (PRINIVIL, ZESTRIL) 10 mg tablet   2. Screening for diabetes mellitus Z13.1 V77.1 HEMOGLOBIN A1C WITH EAG       RTC in 4 weeks with BP log and repeat BMP. Follow-up Disposition:  Return in about 4 weeks (around 9/1/2017), or if symptoms worsen or fail to improve.

## 2017-08-04 NOTE — MR AVS SNAPSHOT
Visit Information Itzel Bolivar Personal Médico Departamento Teléfono del Dep. Número de visita 8/4/2017  1:00 PM Lindsay Fritz, Manas Sharpe [phone] Follow-up Instructions Return in about 4 weeks (around 9/1/2017), or if symptoms worsen or fail to improve. Upcoming Health Maintenance Date Due DTaP/Tdap/Td series (1 - Tdap) 5/4/2004 PAP AKA CERVICAL CYTOLOGY 5/4/2004 INFLUENZA AGE 9 TO ADULT 8/1/2017 Alergias  Review Complete El: 8/4/2017 Por: Armida Grullon LPN A partir del:  8/4/2017 No Known Allergies Vacunas actuales Melly Schillings No hay ninguna vacuna archivada. No revisadas esta visita You Were Diagnosed With   
  
 Marjorie Turcios HTN (hypertension), benign    -  Primary ICD-10-CM: I10 
ICD-9-CM: 401.1 Screening for diabetes mellitus     ICD-10-CM: Z13.1 ICD-9-CM: V77.1 Partes vitales PS Pulso Temperatura Resp [place] ( percentil de crecimiento) Peso (percentil de crecimiento) (!) 156/100 86 97.8 °F (36.6 °C) (Oral) 18 5' 3\" (1.6 m) 127 lb (57.6 kg) LMP (última sue) SpO2 BMI (IMC) Estado obstétrico Estatus de tabaquísmo 07/25/2017 98% 22.5 kg/m2 Having regular periods Never Smoker Historial de signos vitales BMI and BSA Data Body Mass Index Body Surface Area  
 22.5 kg/m 2 1.6 m 2 [place] lista de medicamentos actualizada Lista actualizada el: 8/4/17  1:34 PM.  Keri Lied use sotomayor lista de medicamentos más reciente. amLODIPine 5 mg tablet También conocido duarte:  Asha Suresh Take 1 Tab by mouth daily. butalbital-acetaminophen-caff -40 mg per capsule También conocido duarte:  FIORICET Take 1 Cap by mouth every six (6) hours as needed for Pain. Max Daily Amount: 4 Caps. fluticasone 50 mcg/actuation nasal spray También conocido duarte:  FLONASE 2 Sprays by Both Nostrils route daily. lisinopril 10 mg tablet También conocido daurte:  Valene Pencil Take 1 Tab by mouth daily. prenatal vit-iron fumarate-fa 27 mg iron- 0.8 mg Tab tablet Take 1 Tab by mouth daily. Impresion de recetas Refills  
 amLODIPine (NORVASC) 5 mg tablet 3 Sig: Take 1 Tab by mouth daily. Class: Print Route: Oral  
 lisinopril (PRINIVIL, ZESTRIL) 10 mg tablet 3 Sig: Take 1 Tab by mouth daily. Class: Print Route: Oral  
  
Hicimos lo siguiente AMB POC URINE, MICROALBUMIN, SEMIQUANT (3 RESULTS) [18439 CPT(R)] HEMOGLOBIN A1C WITH EAG [94378 CPT(R)] LIPID PANEL [64765 CPT(R)] Instrucciones de seguimiento Return in about 4 weeks (around 9/1/2017), or if symptoms worsen or fail to improve. Instrucciones para el Paciente DASH Diet: Care Instructions Your Care Instructions The DASH diet is an eating plan that can help lower your blood pressure. DASH stands for Dietary Approaches to Stop Hypertension. Hypertension is high blood pressure. The DASH diet focuses on eating foods that are high in calcium, potassium, and magnesium. These nutrients can lower blood pressure. The foods that are highest in these nutrients are fruits, vegetables, low-fat dairy products, nuts, seeds, and legumes. But taking calcium, potassium, and magnesium supplements instead of eating foods that are high in those nutrients does not have the same effect. The DASH diet also includes whole grains, fish, and poultry. The DASH diet is one of several lifestyle changes your doctor may recommend to lower your high blood pressure. Your doctor may also want you to decrease the amount of sodium in your diet. Lowering sodium while following the DASH diet can lower blood pressure even further than just the DASH diet alone. Follow-up care is a key part of your treatment and safety.  Be sure to make and go to all appointments, and call your doctor if you are having problems. It's also a good idea to know your test results and keep a list of the medicines you take. How can you care for yourself at home? Following the DASH diet · Eat 4 to 5 servings of fruit each day. A serving is 1 medium-sized piece of fruit, ½ cup chopped or canned fruit, 1/4 cup dried fruit, or 4 ounces (½ cup) of fruit juice. Choose fruit more often than fruit juice. · Eat 4 to 5 servings of vegetables each day. A serving is 1 cup of lettuce or raw leafy vegetables, ½ cup of chopped or cooked vegetables, or 4 ounces (½ cup) of vegetable juice. Choose vegetables more often than vegetable juice. · Get 2 to 3 servings of low-fat and fat-free dairy each day. A serving is 8 ounces of milk, 1 cup of yogurt, or 1 ½ ounces of cheese. · Eat 6 to 8 servings of grains each day. A serving is 1 slice of bread, 1 ounce of dry cereal, or ½ cup of cooked rice, pasta, or cooked cereal. Try to choose whole-grain products as much as possible. · Limit lean meat, poultry, and fish to 2 servings each day. A serving is 3 ounces, about the size of a deck of cards. · Eat 4 to 5 servings of nuts, seeds, and legumes (cooked dried beans, lentils, and split peas) each week. A serving is 1/3 cup of nuts, 2 tablespoons of seeds, or ½ cup of cooked beans or peas. · Limit fats and oils to 2 to 3 servings each day. A serving is 1 teaspoon of vegetable oil or 2 tablespoons of salad dressing. · Limit sweets and added sugars to 5 servings or less a week. A serving is 1 tablespoon jelly or jam, ½ cup sorbet, or 1 cup of lemonade. · Eat less than 2,300 milligrams (mg) of sodium a day. If you limit your sodium to 1,500 mg a day, you can lower your blood pressure even more. Tips for success · Start small. Do not try to make dramatic changes to your diet all at once. You might feel that you are missing out on your favorite foods and then be more likely to not follow the plan.  Make small changes, and stick with them. Once those changes become habit, add a few more changes. · Try some of the following: ¨ Make it a goal to eat a fruit or vegetable at every meal and at snacks. This will make it easy to get the recommended amount of fruits and vegetables each day. ¨ Try yogurt topped with fruit and nuts for a snack or healthy dessert. ¨ Add lettuce, tomato, cucumber, and onion to sandwiches. ¨ Combine a ready-made pizza crust with low-fat mozzarella cheese and lots of vegetable toppings. Try using tomatoes, squash, spinach, broccoli, carrots, cauliflower, and onions. ¨ Have a variety of cut-up vegetables with a low-fat dip as an appetizer instead of chips and dip. ¨ Sprinkle sunflower seeds or chopped almonds over salads. Or try adding chopped walnuts or almonds to cooked vegetables. ¨ Try some vegetarian meals using beans and peas. Add garbanzo or kidney beans to salads. Make burritos and tacos with mashed aguilar beans or black beans. Where can you learn more? Go to http://tristaCrescendo Networkskatrin.info/. Enter L215 in the search box to learn more about \"DASH Diet: Care Instructions. \" Current as of: April 3, 2017 Content Version: 11.3 © 1679-4806 digitalbox. Care instructions adapted under license by [employer] (which disclaims liability or warranty for this information). If you have questions about a medical condition or this instruction, always ask your healthcare professional. [place] [address] any warranty or liability for your use of this information. Introducing [facility]! [facility] introduce portal paciente MyChart . Ahora se puede acceder a partes de magaña expediente médico, enviar por correo electrónico la oficina de magaña médico y solicitar renovaciones de medicamentos en línea. En magaña navegador de Internet , Anita Cutler a https://mychart. "[employer]. [employer]/mychart Alesia clic en el usuario por Cathleen Bryan?  Eulogio Diop clic aquí en la Crystal Dunn. Verá la página de registro Center. Ingrese magaña código de [employer] radha y duarte aparece a continuación. Usted no tendrá que [employer] código después de angelika completado el proceso de registro . Si usted no se inscribe antes de la fecha de caducidad , debe solicitar un nuevo código. · MyChart Código de acceso : [id number] Expires: 10/30/2017  2:41 PM 
 
Ingresa los últimos cuatro dígitos de magaña Número de Seguro Social ( xxxx ) y fecha de nacimiento ( dd / mm / aaaa ) duarte se indica y alesia clic en Enviar. Usted será llevado a la siguiente página de registro . Crear un ID MyChart . Esta será magaña ID de inicio de sesión de MyChart y no puede ser [place] , por lo que pensar en igor que es Orie Taty y fácil de recordar . Crear igor contraseña MyChart . Usted puede cambiar magaña contraseña en cualquier momento . Ingrese magaña Password Reset de preguntas y Millan . Sandia Knolls se puede utilizar en un momento posterior si usted olvida magaña contraseña. Introduzca magaña dirección de correo electrónico . Janet Truong recibirá igor notificación por correo electrónico cuando la nueva información está disponible en MyChart . Johny Grannis clic en Registrarse. Mina Dun jr y descargar porciones de magaña expediente médico. 
Alesia clic en el enlace de descarga del menú Resumen para descargar igor copia portátil de magaña información médica . Si tiene [employer] , por favor visite la sección de preguntas frecuentes del sitio web MyChart . Recuerde, MyChart NO es que se utilizará para las necesidades urgentes. Para emergencias médicas , llame al 911 . Ahora disponible en mgaaña iPhone y Android ! Por favor proporcione amarjit resumen de la documentación de cuidado a magaña próximo proveedor. Your primary care clinician is listed as NONE. If you have any questions after today's visit, please call [phone].

## 2017-08-22 NOTE — PROGRESS NOTES
I reviewed with the resident the medical history and the resident's findings on the physical examination. I discussed with the resident the patient's diagnosis and concur with the plan. AA will start with CCB and ACE. Dual agent therapy since currently severe range. Close clinical follow up.

## 2018-01-06 ENCOUNTER — HOSPITAL ENCOUNTER (EMERGENCY)
Age: 33
Discharge: HOME | End: 2018-01-06
Attending: EMERGENCY MEDICINE
Payer: OTHER GOVERNMENT

## 2018-01-06 ENCOUNTER — APPOINTMENT (OUTPATIENT)
Dept: GENERAL RADIOLOGY | Age: 33
End: 2018-01-06
Attending: EMERGENCY MEDICINE
Payer: OTHER GOVERNMENT

## 2018-01-06 VITALS
DIASTOLIC BLOOD PRESSURE: 113 MMHG | SYSTOLIC BLOOD PRESSURE: 164 MMHG | TEMPERATURE: 98.7 F | WEIGHT: 127 LBS | BODY MASS INDEX: 22.5 KG/M2 | HEART RATE: 84 BPM | OXYGEN SATURATION: 100 % | RESPIRATION RATE: 15 BRPM | HEIGHT: 63 IN

## 2018-01-06 DIAGNOSIS — R07.9 CHEST PAIN, UNSPECIFIED TYPE: ICD-10-CM

## 2018-01-06 DIAGNOSIS — I10 HYPERTENSION, UNSPECIFIED TYPE: Primary | ICD-10-CM

## 2018-01-06 LAB
ANION GAP SERPL CALC-SCNC: 6 MMOL/L (ref 5–15)
BASOPHILS # BLD: 0 K/UL (ref 0–0.1)
BASOPHILS NFR BLD: 0 % (ref 0–1)
BUN SERPL-MCNC: 10 MG/DL (ref 6–20)
BUN/CREAT SERPL: 9 (ref 12–20)
CALCIUM SERPL-MCNC: 8.6 MG/DL (ref 8.5–10.1)
CHLORIDE SERPL-SCNC: 102 MMOL/L (ref 97–108)
CO2 SERPL-SCNC: 30 MMOL/L (ref 21–32)
CREAT SERPL-MCNC: 1.12 MG/DL (ref 0.55–1.02)
D DIMER PPP FEU-MCNC: <0.17 MG/L FEU (ref 0–0.65)
EOSINOPHIL # BLD: 0.2 K/UL (ref 0–0.4)
EOSINOPHIL NFR BLD: 1 % (ref 0–7)
ERYTHROCYTE [DISTWIDTH] IN BLOOD BY AUTOMATED COUNT: 13 % (ref 11.5–14.5)
GLUCOSE SERPL-MCNC: 78 MG/DL (ref 65–100)
HCG SERPL QL: NEGATIVE
HCT VFR BLD AUTO: 39.6 % (ref 35–47)
HGB BLD-MCNC: 12.7 G/DL (ref 11.5–16)
LYMPHOCYTES # BLD: 3.2 K/UL (ref 0.8–3.5)
LYMPHOCYTES NFR BLD: 25 % (ref 12–49)
MAGNESIUM SERPL-MCNC: 2.1 MG/DL (ref 1.6–2.4)
MCH RBC QN AUTO: 28.4 PG (ref 26–34)
MCHC RBC AUTO-ENTMCNC: 32.1 G/DL (ref 30–36.5)
MCV RBC AUTO: 88.6 FL (ref 80–99)
MONOCYTES # BLD: 1 K/UL (ref 0–1)
MONOCYTES NFR BLD: 8 % (ref 5–13)
NEUTS SEG # BLD: 8.5 K/UL (ref 1.8–8)
NEUTS SEG NFR BLD: 66 % (ref 32–75)
PLATELET # BLD AUTO: 328 K/UL (ref 150–400)
POTASSIUM SERPL-SCNC: 3.5 MMOL/L (ref 3.5–5.1)
RBC # BLD AUTO: 4.47 M/UL (ref 3.8–5.2)
SODIUM SERPL-SCNC: 138 MMOL/L (ref 136–145)
TROPONIN I SERPL-MCNC: <0.04 NG/ML
WBC # BLD AUTO: 12.9 K/UL (ref 3.6–11)

## 2018-01-06 PROCEDURE — 36415 COLL VENOUS BLD VENIPUNCTURE: CPT | Performed by: EMERGENCY MEDICINE

## 2018-01-06 PROCEDURE — 85025 COMPLETE CBC W/AUTO DIFF WBC: CPT | Performed by: EMERGENCY MEDICINE

## 2018-01-06 PROCEDURE — 85379 FIBRIN DEGRADATION QUANT: CPT | Performed by: EMERGENCY MEDICINE

## 2018-01-06 PROCEDURE — 80048 BASIC METABOLIC PNL TOTAL CA: CPT | Performed by: EMERGENCY MEDICINE

## 2018-01-06 PROCEDURE — 83735 ASSAY OF MAGNESIUM: CPT | Performed by: EMERGENCY MEDICINE

## 2018-01-06 PROCEDURE — 84703 CHORIONIC GONADOTROPIN ASSAY: CPT | Performed by: EMERGENCY MEDICINE

## 2018-01-06 PROCEDURE — 71046 X-RAY EXAM CHEST 2 VIEWS: CPT

## 2018-01-06 PROCEDURE — 93005 ELECTROCARDIOGRAM TRACING: CPT

## 2018-01-06 PROCEDURE — 96374 THER/PROPH/DIAG INJ IV PUSH: CPT

## 2018-01-06 PROCEDURE — 74011250637 HC RX REV CODE- 250/637: Performed by: EMERGENCY MEDICINE

## 2018-01-06 PROCEDURE — 84484 ASSAY OF TROPONIN QUANT: CPT | Performed by: EMERGENCY MEDICINE

## 2018-01-06 PROCEDURE — 99284 EMERGENCY DEPT VISIT MOD MDM: CPT

## 2018-01-06 RX ORDER — GUAIFENESIN 100 MG/5ML
324 LIQUID (ML) ORAL
Status: COMPLETED | OUTPATIENT
Start: 2018-01-06 | End: 2018-01-06

## 2018-01-06 RX ORDER — NITROGLYCERIN 0.4 MG/1
0.4 TABLET SUBLINGUAL
Status: DISCONTINUED | OUTPATIENT
Start: 2018-01-06 | End: 2018-01-06 | Stop reason: HOSPADM

## 2018-01-06 RX ORDER — MORPHINE SULFATE 4 MG/ML
4 INJECTION INTRAVENOUS ONCE
Status: DISCONTINUED | OUTPATIENT
Start: 2018-01-06 | End: 2018-01-06 | Stop reason: HOSPADM

## 2018-01-06 RX ORDER — ONDANSETRON 8 MG/1
8 TABLET, ORALLY DISINTEGRATING ORAL
Qty: 12 TAB | Refills: 0 | Status: SHIPPED | OUTPATIENT
Start: 2018-01-06

## 2018-01-06 RX ORDER — AMLODIPINE BESYLATE 5 MG/1
5 TABLET ORAL
Status: COMPLETED | OUTPATIENT
Start: 2018-01-06 | End: 2018-01-06

## 2018-01-06 RX ORDER — LISINOPRIL 5 MG/1
10 TABLET ORAL
Status: COMPLETED | OUTPATIENT
Start: 2018-01-06 | End: 2018-01-06

## 2018-01-06 RX ADMIN — AMLODIPINE BESYLATE 5 MG: 5 TABLET ORAL at 19:31

## 2018-01-06 RX ADMIN — ASPIRIN 81 MG 324 MG: 81 TABLET ORAL at 17:48

## 2018-01-06 RX ADMIN — LISINOPRIL 10 MG: 5 TABLET ORAL at 19:31

## 2018-01-06 NOTE — ED TRIAGE NOTES
Sudden onset of chest pain while driving to work today, felt short of breath and hot and heart pounding. This started 15 minutes ago.

## 2018-01-06 NOTE — ED PROVIDER NOTES
HPI Comments: 27 y.o. female with past medical history significant for hypertension who presents from home with chief complaint of chest pain. Patient states onset of moderate right-sided chest pain described as a \"pressure\" approximately 15 minutes ago while she was driving. Patient mentions difficulty taking a deep breath and some shortness of breath. Patient also experienced some heart palpitations shortly afterwards that has since subsided. Patient mentions she has a hx of hypertension though she is not very compliant with her medications. Patient denies being on any birth control. Patient denies any hx of blood clots, and she mentions having only a family hx of diabetes. Patient denies any nausea, diaphoresis, leg swelling, abdominal pain, and diarrhea. There are no other acute medical concerns at this time. Old Chart Review: Per note, patient was evaluated by her PCP on 08/04/17 for hypertension and was started on Norvasc and Lisinopril. Social hx: Tobacco Use: No, Alcohol Use: No    Note written by Daria Zhang, as dictated by Ashanti Perez. Troy Avila MD 3:59 PM      The history is provided by the patient and medical records. Past Medical History:   Diagnosis Date    Hypertension        No past surgical history on file. No family history on file. Social History     Social History    Marital status:      Spouse name: N/A    Number of children: N/A    Years of education: N/A     Occupational History    Not on file. Social History Main Topics    Smoking status: Never Smoker    Smokeless tobacco: Never Used    Alcohol use No    Drug use: Not on file    Sexual activity: Not on file     Other Topics Concern    Not on file     Social History Narrative         ALLERGIES: Review of patient's allergies indicates no known allergies. Review of Systems   Constitutional: Negative for chills, diaphoresis and fever. HENT: Negative for ear pain and sore throat.     Eyes: Negative for pain. Respiratory: Positive for shortness of breath. Negative for chest tightness. Cardiovascular: Positive for chest pain and palpitations. Negative for leg swelling. Gastrointestinal: Negative for abdominal pain, nausea and vomiting. Genitourinary: Negative for dysuria and flank pain. Musculoskeletal: Negative for back pain. Skin: Negative for rash. Neurological: Negative for headaches. All other systems reviewed and are negative. Vitals:    01/06/18 1554   BP: (!) 210/115   Pulse: 83   Resp: 20   Temp: 98.7 °F (37.1 °C)   SpO2: 100%   Weight: 57.6 kg (127 lb)   Height: 5' 3\" (1.6 m)            Physical Exam   Constitutional: She appears well-developed and well-nourished. HENT:   Head: Normocephalic and atraumatic. Mouth/Throat: Oropharynx is clear and moist.   Eyes: Conjunctivae and EOM are normal. Pupils are equal, round, and reactive to light. No scleral icterus. Neck: Neck supple. No tracheal deviation present. Cardiovascular: Normal rate, regular rhythm, normal heart sounds and intact distal pulses. Pulmonary/Chest: Effort normal and breath sounds normal. No respiratory distress. Abdominal: Soft. She exhibits no distension. There is no tenderness. There is no rebound and no guarding. Genitourinary:   Genitourinary Comments: deferred   Musculoskeletal: She exhibits no edema. Neurological: She is alert. Skin: Skin is warm and dry. Psychiatric: She has a normal mood and affect. Nursing note and vitals reviewed.      Note written by Daria Aguiar, as dictated by Ashley Ramirez MD 3:59 PM    [facility]  ED Course       Procedures        MDM:  27 y.o. female with past medical history significant for hypertension presents with CP, and high BP  Differential diagnosis includes ACS, hypertensive urgency/ emergency, PE         EKG Per My Interpretation:  Rhythm: normal sinus rhythm;  Rate (approx.): 90; Axis: normal; QRS interval: normal ; ST/T wave: normal; Other findings: normal.     LABORATORY TESTS:  Labs Reviewed   CBC WITH AUTOMATED DIFF - Abnormal; Notable for the following:        Result Value    WBC 12.9 (*)     ABS. NEUTROPHILS 8.5 (*)     All other components within normal limits   METABOLIC PANEL, BASIC - Abnormal; Notable for the following:     Creatinine 1.12 (*)     BUN/Creatinine ratio 9 (*)     GFR est non-AA 56 (*)     All other components within normal limits   MAGNESIUM   TROPONIN I   D DIMER   SAMPLES BEING HELD   HCG QL SERUM   SAMPLE TO BLOOD BANK       IMAGING RESULTS:  Xr Chest Pa Lat    Result Date: 1/6/2018  Exam:  2 view chest Indication: Chest pain, shortness of breath today PA and lateral views demonstrate normal heart size. There is no acute process in the lung fields. The osseous structures are unremarkable. Impression: Normal exam.       MEDICATIONS GIVEN:  Medications   morphine 4 mg (0 mg IntraVENous Held 1/6/18 1604)   nitroglycerin (NITROSTAT) tablet 0.4 mg (0 mg SubLINGual Held 1/6/18 1600)   aspirin chewable tablet 324 mg (324 mg Oral Given 1/6/18 1748)   amLODIPine (NORVASC) tablet 5 mg (5 mg Oral Given 1/6/18 1931)   lisinopril (PRINIVIL, ZESTRIL) tablet 10 mg (10 mg Oral Given 1/6/18 1931)       IMPRESSION:  1. Hypertension, unspecified type    2.  Chest pain, unspecified type        PLAN:  -  Re-start meds, zofran prn for nausea  -   Follow-up Information     Follow up With Details Comments Contact Info    [address] MessageParty Call in 2 days  [facility]  [phone]    [facility] EMERGENCY DEPT  If symptoms worsen ( worsening Chest Pain, shortness of breath, or new concerns) [address] [place] [address]  [phone]          Disposition:  home, see patient instructions for treatment and plan    Condition:  improved, CP resolved    Total critical care time spent exclusive of procedures:  0 minutes    Madeline Hammond MD

## 2018-01-07 NOTE — DISCHARGE INSTRUCTIONS
Dolor de pecho: Instrucciones de cuidado - [ Chest Pain: Care Instructions ]  Instrucciones de cuidado    El dolor de pecho puede tener muchas causas. Algunas no son graves y mejorarán por sí solas en pocos días. Ajay algunos tipos de dolor de pecho requieren más pruebas y [place]. Es posible que magaña médico le haya recomendado igor visita de seguimiento dentro de las 8 a 12 horas siguientes. Si no mejora, es posible que necesite [address] pruebas o [place]. Aunque magaña médico le haya dado de tatiana, es necesario que esté atento a cualquier problema que se presente. El médico le hizo un cuidadoso chequeo, ajay a veces los problemas pueden aparecer posteriormente. Si tiene nuevos síntomas o éstos no mejoran, obtenga atención médica de inmediato. Si tiene dolor o presión en el pecho que empeora o es diferente y que dura más de 5 minutos, o se desmayó (perdió el conocimiento), llame al 911 o busque otra ayuda de emergencia de inmediato. Acudir a igor consulta médica es sólo un paso en magaña tratamiento. Aunque se sienta mejor, todavía deberá hacer lo que magaña médico le recomiende, duarte asistir a todas las visitas de seguimiento sugeridas y tl los medicamentos exactamente [employer]	Converse fueron indicados. [place] le ayudará a recuperarse y prevenir problemas futuros. ¿Cómo puede cuidarse en el hogar? · Descanse hasta que se sienta mejor. · [place] rafat medicamentos exactamente duarte le fueron recetados. Llame a magaña médico si cj estar teniendo problemas con magaña medicamento. · No conduzca después de tl un analgésico (medicamento para el dolor) recetado. ¿Cuándo debe pedir ayuda? Llame al 911 si:  ? · Se desmayó (perdió el conocimiento). ? · Tiene graves dificultades para respirar. ? · Tiene síntomas de un ataque al corazón. Estos podrían incluir:  ¨ Dolor o presión en el pecho, o igor sensación extraña en el pecho. ¨ Sudoración. ¨ Falta de aire. ¨ Náuseas o vómito.   ¨ Dolor, presión o igor sensación extraña en la espalda, el bartolome, la mandíbula, la parte superior del abdomen o en nick o ambos hombros o brazos. ¨ Aturdimiento o debilidad repentina. ¨ Latidos del corazón rápidos o irregulares. Después de llamar al 911, es posible que el operador le diga que mastique 1 aspirina para adultos o de 2 a 4 aspirinas de dosis baja. Espere a igor ambulancia. No intente conducir usted mismo. ? Llame hoy a magaña médico si:  ? · Tiene cualquier dificultad para respirar. ? · El dolor en el pecho empeora. ? · [employer] o aturdimiento, o que está a punto de Minco. ? · No mejora duarte se esperaba. ? · Tiene dolor de pecho nuevo o diferente. ¿Dónde puede encontrar más información en inglés? Aundria Dates a http://trista-katrin.info/. Escriba A120 en la búsqueda para aprender más acerca de \"Dolor de pecho: Instrucciones de cuidado - [ Chest Pain: Care Instructions ]. \"  Revisado: 20 Ирина Hernandez 2017  Versión del contenido: 11.4  © 3500-4972 Healthwise, Incorporated. Las instrucciones de cuidado fueron adaptadas bajo licencia por [employer] (which disclaims liability or warranty for this information). Si usted tiene [place] afección médica o sobre estas instrucciones, siempre pregunte a magaña profesional de lynda. Healthwise, Incorporated niega toda garantía o responsabilidad por magaña uso de esta información.

## 2018-01-08 LAB
ATRIAL RATE: 90 BPM
CALCULATED P AXIS, ECG09: 60 DEGREES
CALCULATED R AXIS, ECG10: 25 DEGREES
CALCULATED T AXIS, ECG11: 38 DEGREES
DIAGNOSIS, 93000: NORMAL
P-R INTERVAL, ECG05: 134 MS
Q-T INTERVAL, ECG07: 376 MS
QRS DURATION, ECG06: 78 MS
QTC CALCULATION (BEZET), ECG08: 459 MS
VENTRICULAR RATE, ECG03: 90 BPM

## 2018-10-01 ENCOUNTER — OFFICE VISIT (OUTPATIENT)
Dept: FAMILY MEDICINE CLINIC | Age: 33
End: 2018-10-01

## 2018-10-01 ENCOUNTER — HOSPITAL ENCOUNTER (EMERGENCY)
Age: 33
Discharge: HOME | End: 2018-10-01
Attending: EMERGENCY MEDICINE
Payer: OTHER GOVERNMENT

## 2018-10-01 VITALS
SYSTOLIC BLOOD PRESSURE: 179 MMHG | OXYGEN SATURATION: 100 % | RESPIRATION RATE: 20 BRPM | TEMPERATURE: 98.1 F | HEART RATE: 65 BPM | DIASTOLIC BLOOD PRESSURE: 129 MMHG | WEIGHT: 125 LBS | HEIGHT: 63 IN | BODY MASS INDEX: 22.15 KG/M2

## 2018-10-01 VITALS
TEMPERATURE: 98.1 F | OXYGEN SATURATION: 100 % | WEIGHT: 125 LBS | HEIGHT: 63 IN | HEART RATE: 74 BPM | RESPIRATION RATE: 14 BRPM | BODY MASS INDEX: 22.15 KG/M2 | DIASTOLIC BLOOD PRESSURE: 106 MMHG | SYSTOLIC BLOOD PRESSURE: 187 MMHG

## 2018-10-01 DIAGNOSIS — I16.1 HYPERTENSIVE EMERGENCY: Primary | ICD-10-CM

## 2018-10-01 DIAGNOSIS — Z91.199 NON-COMPLIANCE WITH TREATMENT: ICD-10-CM

## 2018-10-01 DIAGNOSIS — I10 HYPERTENSION, UNSPECIFIED TYPE: Primary | ICD-10-CM

## 2018-10-01 DIAGNOSIS — M79.604 LEG PAIN, BILATERAL: ICD-10-CM

## 2018-10-01 DIAGNOSIS — M79.605 LEG PAIN, BILATERAL: ICD-10-CM

## 2018-10-01 DIAGNOSIS — M79.604 PAIN OF RIGHT LOWER EXTREMITY: ICD-10-CM

## 2018-10-01 LAB
ALBUMIN SERPL-MCNC: 3.5 G/DL (ref 3.5–5)
ALBUMIN/GLOB SERPL: 0.9 {RATIO} (ref 1.1–2.2)
ALP SERPL-CCNC: 96 U/L (ref 45–117)
ALT SERPL-CCNC: 25 U/L (ref 12–78)
AMORPH CRY URNS QL MICRO: ABNORMAL
ANION GAP SERPL CALC-SCNC: 9 MMOL/L (ref 5–15)
APPEARANCE UR: ABNORMAL
AST SERPL-CCNC: 16 U/L (ref 15–37)
BACTERIA URNS QL MICRO: NEGATIVE /HPF
BASOPHILS # BLD: 0.1 K/UL (ref 0–0.1)
BASOPHILS NFR BLD: 1 % (ref 0–1)
BILIRUB SERPL-MCNC: 0.1 MG/DL (ref 0.2–1)
BILIRUB UR QL: NEGATIVE
BUN SERPL-MCNC: 14 MG/DL (ref 6–20)
BUN/CREAT SERPL: 18 (ref 12–20)
CALCIUM SERPL-MCNC: 8.8 MG/DL (ref 8.5–10.1)
CHLORIDE SERPL-SCNC: 104 MMOL/L (ref 97–108)
CO2 SERPL-SCNC: 27 MMOL/L (ref 21–32)
COLOR UR: ABNORMAL
CREAT SERPL-MCNC: 0.77 MG/DL (ref 0.55–1.02)
D DIMER PPP FEU-MCNC: 0.26 MG/L FEU (ref 0–0.65)
DIFFERENTIAL METHOD BLD: NORMAL
EOSINOPHIL # BLD: 0.2 K/UL (ref 0–0.4)
EOSINOPHIL NFR BLD: 2 % (ref 0–7)
EPITH CASTS URNS QL MICRO: ABNORMAL /LPF
ERYTHROCYTE [DISTWIDTH] IN BLOOD BY AUTOMATED COUNT: 13.3 % (ref 11.5–14.5)
GLOBULIN SER CALC-MCNC: 4 G/DL (ref 2–4)
GLUCOSE SERPL-MCNC: 91 MG/DL (ref 65–100)
GLUCOSE UR STRIP.AUTO-MCNC: NEGATIVE MG/DL
GRAN CASTS URNS QL MICRO: ABNORMAL /LPF
HCG UR QL: NEGATIVE
HCT VFR BLD AUTO: 36.4 % (ref 35–47)
HGB BLD-MCNC: 11.6 G/DL (ref 11.5–16)
HGB UR QL STRIP: NEGATIVE
IMM GRANULOCYTES # BLD: 0 K/UL (ref 0–0.04)
IMM GRANULOCYTES NFR BLD AUTO: 0 % (ref 0–0.5)
KETONES UR QL STRIP.AUTO: NEGATIVE MG/DL
LEUKOCYTE ESTERASE UR QL STRIP.AUTO: NEGATIVE
LYMPHOCYTES # BLD: 3.3 K/UL (ref 0.8–3.5)
LYMPHOCYTES NFR BLD: 30 % (ref 12–49)
MCH RBC QN AUTO: 28.1 PG (ref 26–34)
MCHC RBC AUTO-ENTMCNC: 31.9 G/DL (ref 30–36.5)
MCV RBC AUTO: 88.1 FL (ref 80–99)
MONOCYTES # BLD: 0.8 K/UL (ref 0–1)
MONOCYTES NFR BLD: 8 % (ref 5–13)
NEUTS SEG # BLD: 6.6 K/UL (ref 1.8–8)
NEUTS SEG NFR BLD: 60 % (ref 32–75)
NITRITE UR QL STRIP.AUTO: NEGATIVE
NRBC # BLD: 0 K/UL (ref 0–0.01)
NRBC BLD-RTO: 0 PER 100 WBC
PH UR STRIP: 6.5 [PH] (ref 5–8)
PLATELET # BLD AUTO: 314 K/UL (ref 150–400)
PMV BLD AUTO: 10 FL (ref 8.9–12.9)
POTASSIUM SERPL-SCNC: 3.4 MMOL/L (ref 3.5–5.1)
PROT SERPL-MCNC: 7.5 G/DL (ref 6.4–8.2)
PROT UR STRIP-MCNC: NEGATIVE MG/DL
RBC # BLD AUTO: 4.13 M/UL (ref 3.8–5.2)
RBC #/AREA URNS HPF: ABNORMAL /HPF (ref 0–5)
SODIUM SERPL-SCNC: 140 MMOL/L (ref 136–145)
SP GR UR REFRACTOMETRY: 1.02 (ref 1–1.03)
UR CULT HOLD, URHOLD: NORMAL
UROBILINOGEN UR QL STRIP.AUTO: 1 EU/DL (ref 0.2–1)
WBC # BLD AUTO: 11 K/UL (ref 3.6–11)
WBC URNS QL MICRO: ABNORMAL /HPF (ref 0–4)

## 2018-10-01 PROCEDURE — 93970 EXTREMITY STUDY: CPT

## 2018-10-01 PROCEDURE — 81025 URINE PREGNANCY TEST: CPT

## 2018-10-01 PROCEDURE — 81001 URINALYSIS AUTO W/SCOPE: CPT | Performed by: EMERGENCY MEDICINE

## 2018-10-01 PROCEDURE — 99284 EMERGENCY DEPT VISIT MOD MDM: CPT

## 2018-10-01 PROCEDURE — 85379 FIBRIN DEGRADATION QUANT: CPT | Performed by: EMERGENCY MEDICINE

## 2018-10-01 PROCEDURE — 80053 COMPREHEN METABOLIC PANEL: CPT | Performed by: EMERGENCY MEDICINE

## 2018-10-01 PROCEDURE — 85025 COMPLETE CBC W/AUTO DIFF WBC: CPT | Performed by: EMERGENCY MEDICINE

## 2018-10-01 PROCEDURE — 74011250637 HC RX REV CODE- 250/637: Performed by: EMERGENCY MEDICINE

## 2018-10-01 PROCEDURE — 36415 COLL VENOUS BLD VENIPUNCTURE: CPT | Performed by: EMERGENCY MEDICINE

## 2018-10-01 RX ORDER — LISINOPRIL 5 MG/1
10 TABLET ORAL
Status: COMPLETED | OUTPATIENT
Start: 2018-10-01 | End: 2018-10-01

## 2018-10-01 RX ORDER — AMLODIPINE BESYLATE 5 MG/1
5 TABLET ORAL DAILY
Qty: 30 TAB | Refills: 0 | Status: SHIPPED | OUTPATIENT
Start: 2018-10-01 | End: 2018-10-30 | Stop reason: SDUPTHER

## 2018-10-01 RX ORDER — LISINOPRIL 10 MG/1
10 TABLET ORAL DAILY
Qty: 30 TAB | Refills: 0 | Status: SHIPPED | OUTPATIENT
Start: 2018-10-01 | End: 2018-10-30 | Stop reason: SDUPTHER

## 2018-10-01 RX ORDER — KETOROLAC TROMETHAMINE 30 MG/ML
15 INJECTION, SOLUTION INTRAMUSCULAR; INTRAVENOUS
Status: DISCONTINUED | OUTPATIENT
Start: 2018-10-01 | End: 2018-10-01

## 2018-10-01 RX ORDER — AMLODIPINE BESYLATE 5 MG/1
5 TABLET ORAL
Status: COMPLETED | OUTPATIENT
Start: 2018-10-01 | End: 2018-10-01

## 2018-10-01 RX ADMIN — LISINOPRIL 10 MG: 5 TABLET ORAL at 11:46

## 2018-10-01 RX ADMIN — AMLODIPINE BESYLATE 5 MG: 5 TABLET ORAL at 11:45

## 2018-10-01 NOTE — ED NOTES
Introduced self to patient as primary nurse and assumed care. Bhavna Murillo, vascular tech, at bedside performing ordered duplex. Will perform assessment following duplex.

## 2018-10-01 NOTE — PROGRESS NOTES
Chief Complaint   Patient presents with    Leg Pain     X 2 weeks,bilateral legs    Migraine     X 2 weeks, on and off     1. Have you been to the ER, urgent care clinic since your last visit? Hospitalized since your last visit? No    2. Have you seen or consulted any other health care providers outside of the [address] [place] [address] since your last visit? Include any pap smears or colon screening.  No

## 2018-10-01 NOTE — PROCEDURES
[facility]  *** FINAL REPORT ***    Name: Mortimer Benes  MRN: [id number]    Outpatient  : 04 May 1985  HIS Order #: [id number]  [id number] [facility] Visit #: [id number]  Date: 01 Oct 2018    TYPE OF TEST: Peripheral Venous Testing    REASON FOR TEST  Pain in limb    Right Leg:-  Deep venous thrombosis:           No  Superficial venous thrombosis:    No  Deep venous insufficiency:        No  Superficial venous insufficiency: No    Left Leg:-  Deep venous thrombosis:           No  Superficial venous thrombosis:    No  Deep venous insufficiency:        No  Superficial venous insufficiency: No      INTERPRETATION/FINDINGS  PROCEDURE:  BILATERAL LE VENOUS DUPLEX. Evaluation of lower extremity veins with ultrasound (B-mode imaging,  pulsed Doppler, color Doppler). Includes the common femoral, deep  femoral, femoral, popliteal, posterior tibial, peroneal, and great  saphenous veins. FINDINGS:  Anneliese Doles scale and color flow duplex images of the veins in  both lower extremities demonstrate normal compressibility, spontaneous   and augmented flow profiles, and absence of filling defects  throughout the deep and superficial veins in both lower extremities. CONCLUSION: Bilateral lower extremity venous duplex negative for deep  venous thrombosis or thrombophlebitis. ADDITIONAL COMMENTS    I have personally reviewed the data relevant to the interpretation of  this  study. TECHNOLOGIST: Juana Tiwari RDCS  Signed: 10/01/2018 12:20 PM    PHYSICIAN: Abbe Stewart.  Gregory Mendosa MD  Signed: 10/02/2018 08:04 AM

## 2018-10-01 NOTE — PROGRESS NOTES
HPI     Chief Complaint   Patient presents with    Leg Pain     X 2 weeks,bilateral legs    Migraine     X 2 weeks, on and off     She is a 26 y.o. female who presents for headache and leg pain. Headache  - Pressure in her head along temples radiating back towards occiput  - Supposed to be on BP meds, not taking for 2 months  - Out of her meds at home  - Needs refill of Lisionpril, Norvasc    Leg Pain   - Has been going on for weeks  - Occasional shortness of breath with leg pain  - Has not been on any long plane rides or train rides  - Not on oral contraceptives  - No chest pain or SOB now    Review of Systems   Constitutional: Negative for chills and fever. Eyes: Negative for visual disturbance. Respiratory: Positive for shortness of breath. Cardiovascular: Negative for leg swelling. Neurological: Positive for headaches. Reviewed PmHx, RxHx, FmHx, SocHx, AllgHx and updated and dated in the chart. Physical Exam:  Visit Vitals    BP (!) 179/129 (BP 1 Location: Right arm, BP Patient Position: Sitting)    Pulse 65    Temp 98.1 °F (36.7 °C) (Oral)    Resp 20    Ht 5' 3\" (1.6 m)    Wt 125 lb (56.7 kg)    LMP 09/21/2018    SpO2 100%    BMI 22.14 kg/m2     Physical Exam   Constitutional: She appears well-developed and well-nourished. No distress. Cardiovascular: Normal rate, regular rhythm and normal heart sounds. Exam reveals no gallop and no friction rub. No murmur heard. Pulmonary/Chest: Effort normal and breath sounds normal. No respiratory distress. She has no wheezes. She has no rales. Neurological: She is alert. No cranial nerve deficit. She exhibits normal muscle tone. Coordination normal.   CN II- XII intact, normal fundoscopic exam, no slurred speech, no focal deficits   Skin: Skin is warm and dry. She is not diaphoretic. Psychiatric: She has a normal mood and affect. Her behavior is normal.   Nursing note and vitals reviewed.     No results found for this or any previous visit (from the past 12 hour(s)). Assessment / Plan     Hypertensive Emergency  - Given DBP >120 x2 and headaches will send to ER to rule out hypertensive emergency  - Have called ER to let them know of patient's expected arrival  - Boyfriend who is accompanying her will drive her to ED  - Patient agreeable to go to ER  - Follow up in 2-3 weeks for ER follow up    Leg Pain  - Likely needs RLE doppler to rule out DVT and possible CTA chest depending on results    I have discussed the diagnosis with the patient and the intended plan as seen in the above orders. The patient has received an after-visit summary and questions were answered concerning future plans. I have discussed medication side effects and warnings with the patient as well.     Patient discussed with Dr. Rosmery Butler DO (Attending)    Samuel Paulino DO  Family Medicine Resident

## 2018-10-01 NOTE — ED PROVIDER NOTES
HPI Comments: 11:31 AM 
I have evaluated the patient as the Provider in Triage. I have reviewed Her vital signs and the triage nurse assessment. I have talked with the patient and any available family and advised that I am the provider in triage and have ordered the appropriate study to initiate their work up based on the clinical presentation during my assessment. I have advised that the patient will be accommodated in the Main ED as soon as possible. I have also requested to contact the triage nurse or myself immediately if the patient experiences any changes in their condition during this brief waiting period. Headache not taking bp meds. Leg pain travel 2 months ago sent from pcp office. Denies cp or sob today Shiela Briones MD 
 
26 y.o. female with past medical history significant for HTN, who presents ambulatory to the ED accompanied by spouse, with chief complaint of hypertension and bilateral leg pain. Pt states that she has been experiencing bilateral leg pain and a headache for ~2 weeks. She reports that pain was different today which prompted her to visit [address]. Pt states that Dr. Alka Cade, DO, told her to come to the ED. She reports that headache is located on the right side and radiates posteriorly, and describes it as \"pressure\". Pt states that she has not been taking her blood pressure medication for \"a couple of months\". Pt denies any leg swelling or h/o DVT/PE. She also denies any recent surgery, or recent prolonged travel. Pt specifically denies chest pain, shortness of breath, fever, visual disturbance, focal weakness/numbness, neck stiffness, cough and vomiting. There are no other acute medical concerns at this time. Social hx: Negative for Tobacco use; Negative for EtOH use; Negative for Illicit Drug use PCP: None Note written by Daria De Anda, as dictated by Solange Vasquez PA-C 12:55 PM 
 
 The history is provided by the patient, medical records and the spouse. No  was used. Past Medical History:  
Diagnosis Date  Hypertension History reviewed. No pertinent surgical history. History reviewed. No pertinent family history. Social History Social History  Marital status:  Spouse name: N/A  
 Number of children: N/A  
 Years of education: N/A Occupational History  Not on file. Social History Main Topics  Smoking status: Never Smoker  Smokeless tobacco: Never Used  Alcohol use No  
 Drug use: No  
 Sexual activity: Yes  
  Partners: Male Other Topics Concern  Not on file Social History Narrative ALLERGIES: Review of patient's allergies indicates no known allergies. Review of Systems Constitutional: Negative for fever. Eyes: Negative for visual disturbance. Respiratory: Negative for cough and shortness of breath. Cardiovascular: Negative for chest pain and leg swelling. Gastrointestinal: Negative for vomiting. Musculoskeletal: Positive for myalgias (Bilateral lower extremities). Skin: Negative for rash. Neurological: Positive for headaches (Right side). Negative for seizures, speech difficulty, weakness and numbness. All other systems reviewed and are negative. Vitals:  
 10/01/18 1130 10/01/18 1235 BP: (!) 195/116 (!) 187/106 Pulse: 74 Resp: 14 Temp: 98.1 °F (36.7 °C) SpO2: 100% 100% Weight: 56.7 kg (125 lb) Height: 5' 3\" (1.6 m) Physical Exam  
Constitutional: She is oriented to person, place, and time. She appears well-developed and well-nourished. No distress. [de-identified], talkative, well-appearing  female HENT:  
Head: Normocephalic and atraumatic. Right Ear: External ear normal.  
Left Ear: External ear normal.  
Eyes: Conjunctivae and EOM are normal. Pupils are equal, round, and reactive to light. No scleral icterus. Neck: Normal range of motion. Neck supple. No tracheal deviation present. Cardiovascular: Normal rate, regular rhythm and normal heart sounds. Exam reveals no gallop and no friction rub. No murmur heard. Pulmonary/Chest: Effort normal and breath sounds normal. No stridor. No respiratory distress. She has no wheezes. Abdominal: Soft. She exhibits no distension. There is no tenderness. Musculoskeletal: Normal range of motion. No peripheral edema Neurological: She is alert and oriented to person, place, and time. No cranial nerve deficit (II-XII tested and intact). Coordination normal.  
Clear speech Normal  strength Skin: Skin is warm and dry. Psychiatric: She has a normal mood and affect. Her behavior is normal.  
Nursing note and vitals reviewed. MDM Number of Diagnoses or Management Options Hypertension, unspecified type:  
Leg pain, bilateral:  
Diagnosis management comments: 26year old female longstanding hx HTN presenting from PCP for 2 weeks of intermittent headache, bilateral leg pain. No CP, SOB, or focal neurologic symptoms. Normal neuro exam.  Negative duplex, overall reassuring labs, UA. BP improved to 167/107 after medications. Discussed importance of compliance with medications, risks of chronic uncontrolled HTN. Return precautions reviewed. Amount and/or Complexity of Data Reviewed Clinical lab tests: ordered and reviewed Tests in the radiology section of CPT®: ordered and reviewed Discuss the patient with other providers: yes (Dr. Gema Vasques, ED attending) ED Course Procedures Pt erassessed, /107. HA 4/10. Discussed all results with patient. Discussed need for continued PCP f/u, importance of adhering to treatment plan.  
BRENDA Mac 
1:03 PM

## 2018-10-01 NOTE — PROGRESS NOTES
Bilateral LE venous duplex completed. Preliminary results given to Kunal Rondon, Estrella [place] at 1215.

## 2018-10-01 NOTE — DISCHARGE INSTRUCTIONS
Presión arterial tatiana: Instrucciones de cuidado - [ High Blood Pressure: Care Instructions ]  Instrucciones de cuidado    Si magaña presión arterial suele ser superior a 130/80, usted tiene presión arterial tatiana o hipertensión. [place] significa que el número de [place] es 130 o más alto o que el número de abajo es [de-identified] o [address], o ambas cosas. A pesar de lo que mucha gente cj, la hipertensión no suele causar dolor de chrystal ni provocar mareos o aturdimiento. Generalmente, no presenta síntomas. Sin embargo, aumenta el riesgo de ataque al corazón, ataque cerebral, y daños en los riñones o en los ojos. A mayor presión arterial, mayor riesgo. Magaña médico le dará igor meta para magaña presión arterial. Magaña meta se basará en magaña lynda y mgaaña edad. Los cambios de estilo de nancy, duarte alimentarse en forma saludable y [place], siempre son importantes para ayudarle a bajar la presión arterial. También podría tl medicamentos para lograr magaña meta para la presión arterial.  La atención de seguimiento es igor parte clave de magaña tratamiento y seguridad. Asegúrese de hacer y acudir a todas las citas, y llame a magaña médico si está teniendo problemas. También es igor buena idea saber los resultados de rafat exámenes y mantener igor lista de los medicamentos que james. ¿Cómo puede cuidarse en el hogar? Tratamiento médico  · Si tg de tl rafat medicamentos, magaña presión arterial volverá a subir. Es posible que deba tl un tipo de [place], o más de [place], para reducir la presión arterial. Sea robert con los medicamentos. [employer] magaña medicamento exactamente duarte se lo hayan indicado. Llame a magaña médico si cj estar teniendo problemas con magaña medicamento. · Hable con magaña médico antes de empezar a tl [facility].  La aspirina puede ayudar a determinadas personas a reducir magaña riesgo de tener un ataque cardíaco o un ataque cerebral. Ajay tl aspirina no es adecuado para todo el TRENGEREID, debido a que puede causar sangrado grave.  · Visite a magaña médico periódicamente. Usted podría necesitar consultar a magaña médico con más frecuencia al principio o hasta que se reduzca magaña presión arterial.  · Si usted está tomando medicamentos para la presión arterial, hable con magaña médico antes de tl medicamentos descongestionantes o antiinflamatorios, duarte ibuprofeno. Algunos de estos medicamentos pueden elevar la presión arterial.  · Aprenda a revisarse la presión arterial en magaña hogar. Cambios en el estilo de nancy  · Mantenga un peso saludable. [place] es particularmente importante si aumenta de peso en la ginny de la cintura. Bajar solo 10 libras (4.5 kg) puede ayudarle a reducir magaña presión arterial.  · Alesia más ejercicios si magaña médico se lo recomienda. Caminar es igor buena opción. Poco a poco, aumente la distancia que [employer]. Intente hacer por lo menos 30 minutos de ejercicio la mayoría de los días de la [place]. También podría nadar, montar en bicicleta o hacer otras actividades. · No tome alcohol o limite la cantidad que oz. Hable con magaña médico acerca de si puede tl alcohol. · Trate de limitar la cantidad de sodio que consume a menos de 2,300 miligramos (mg) al día. Magaña médico podría pedirle que trate de consumir menos de 1,500 mg al día. · Coma abundantes frutas (duarte bananas [plátanos] y naranjas), verduras, legumbres, granos integrales y productos lácteos de bajo contenido de [place]. · Reduzca la cantidad de grasas saturadas en magaña dieta. Los productos derivados de los [place], duarte la [place], el queso y la carne, contienen grasas saturadas. El limitar estos alimentos podría ayudarle a bajar de peso y [place] reducir magaña riesgo de igor enfermedad cardíaca. · No fume. El hábito de fumar aumenta magaña riesgo de ataque cerebral y ataque al corazón. Si necesita ayuda para dejar de fumar, hable con magaña médico sobre programas y medicamentos para dejar de fumar.  Estos pueden aumentar rafat probabilidades de dejar el hábito para siempre. ¿Cuándo debe pedir ayuda? Llame al 911 en cualquier momento que considere que necesita atención de [place]. Why puede significar que tiene síntomas que sugieren que magaña presión arterial le está causando un problema cardíaco o vascular grave. Magaña presión arterial podría ser superior a 180/110.   Por ejemplo, llame al 911 si:    · Tiene síntomas de un ataque al corazón. Estos pueden incluir:  ¨ Dolor o presión en el pecho, o igor sensación extraña en el pecho. ¨ Sudoración. ¨ Falta de aire. ¨ Náuseas o vómito. ¨ Dolor, presión o igor sensación extraña en la espalda, el bartolome, la mandíbula, la parte superior del abdomen o en nick o ambos hombros o brazos. ¨ Aturdimiento o debilidad repentina. ¨ Latidos del corazón rápidos o irregulares.     · Tiene síntomas de un ataque cerebral. Estos pueden incluir:  ¨ Entumecimiento, hormigueo, debilidad o parálisis repentinos en la krystyna, el brazo o la pierna, sobre todo en un solo lado del cuerpo. ¨ Cambios repentinos en la visión. ¨ Dificultades repentinas para hablar. ¨ Confusión repentina o dificultad súbita para comprender frases sencillas. ¨ Problemas repentinos para caminar o mantener el equilibrio. ¨ Dolor de [place] intenso y repentino, distinto de los negrito de chrystal anteriores.     · Tiene un dolor intenso en la espalda o el abdomen.    No espere a que la presión arterial baje por sí emerson. Obtenga ayuda de inmediato.   Llame a magaña médico ahora mismo o busque atención de inmediato si:    · Tiene la presión arterial mucho más tatiana de lo normal (duarte 180/110 o más tatiana), oralia no tiene síntomas.     · Piensa que la presión arterial tatiana le está provocando síntomas, duarte:  ¨ Dolor de chrystal intenso. ¨ Visión borrosa.    Vigile de cerca los cambios en magaña lynda, y asegúrese de comunicarse con magaña médico si:    · Magaña presión arterial mide 140/90 o más en al menos 2 ocasiones.  Why significa que el número de [place] es 140 o superior o el número de [facility] es 80 o superior, o ambas cosas.     · Deya que podría estar teniendo efectos secundarios de los medicamentos para la presión arterial.     · Sotomayor presión arterial suele ser normal, oralia se eleva por encima de lo normal en al menos 2 ocasiones. ¿Dónde puede encontrar más información en inglés? Ana Thomas a http://trista-katrin.info/. Kirsty Grullon [id number] en la búsqueda para aprender más acerca de \"Presión arterial tatiana: Instrucciones de cuidado - [ High Blood Pressure: Care Instructions ]. \"  Revisado: 6 diciembre, 2017  Versión del contenido: 11.7  © 1532-0634 Healthwise, Incorporated. Las instrucciones de cuidado fueron adaptadas bajo licencia por [employer] (which disclaims liability or warranty for this information). Si usted tiene [place] afección médica o sobre estas instrucciones, siempre pregunte a sotomayor profesional de lynda. Healthwise, Incorporated niega toda garantía o responsabilidad por sotomayor uso de esta información. Aprenda sobre la presión arterial tatiana - [ Learning About High Blood Pressure ]  ¿Qué es la presión arterial tatiana? La presión arterial es brigitte medida de la fuerza que ejerce la katherine contra las go de las arterias. Es normal que la presión arterial suba y baje a lo chang del día, oralia si se mantiene tatiana, usted tiene la presión arterial tatiana. Otro nombre para la presión arterial tatiana es hipertensión. Dos números le indican sotomayor presión arterial. El primer número indica la presión sistólica. Esta muestra qué tan grace presiona la katherine cuando el corazón está bombeando. El naresh número indica la presión diastólica. Esta muestra qué tan grace presiona la [employer] latidos, cuando el corazón está relajado y se está llenando de [place]. Brigitte presión arterial de menos de 120/80 (se dice \"120 sobre 80\") es ideal para un adulto. La presión arterial tatiana es de 130/80 o superior.  Diandra tiene la presión arterial tatiana si el número de [facility] es [place] número de abajo es [address] alto, o ambas cosas. ¿Qué ocurre cuando tiene presión arterial tatiana? · La katherine fluye por las arterias con [place]. Con el tiempo, esto daña las go de las arterias. Ajay usted no puede sentirlo. La presión arterial tatiana generalmente no causa síntomas. · La grasa y el calcio empiezan a acumularse en las arterias. Esta acumulación se llama placa. La placa hace que las arterias marlin más estrechas y [place]. La katherine no puede fluir a través de ellas tan fácilmente. · Esta falta de un buen flujo de katherine empieza a dañar [employer] de magaña cuerpo. [place] puede conducir a problemas duarte la enfermedad de las arterias coronarias y un ataque cardíaco, insuficiencia cardíaca, ataque cerebral, insuficiencia renal y [de-identified] a los ojos. ¿Cómo puede prevenir la presión arterial tatiana? · Mantenga un peso saludable. · Trate de limitar la cantidad de sodio que consume a menos de 2,300 miligramos (mg) al día. Si limita magaña consumo de sodio a 1,500 mg al día, puede reducir magaña presión arterial aún más. ¨ Compre alimentos Murtis Creamer diga \"sin sal\" (\"unsalted\"), \"brody de sodio\" (\"sodium-free\") o \"bajo en sodio\" (\"low-sodium\"). Los alimentos que indiquen en la etiqueta \"reducido en sodio\" (\"reduced-sodium\") y \"poco sodio\" (\"light sodium\") aún pueden contener demasiado sodio. ¨ [place]one rafat comidas con ajo, jugo de rolf, cebolla, vinagre, hierbas y especias en lugar de sal. No use salsa de soya, salsa para mariah, sal de cebolla, sal de ajo, mostaza ni ketchup (salsa de tomate) en rafat alimentos. ¨ Use menos sal (o nada) de lo que indique la receta. Por lo general, puede añadir la mitad de la cantidad de antonette que pide la receta sin que la comida pierda el sabor. · Manténgase activo físicamente. Alesia por lo menos 30 minutos de ejercicio la mayoría de los días de la [place]. Caminar es igor buena opción.  Bartholome Ao desee hacer otras actividades, duarte correr, nadar, [employer] en bicicleta, jugar al tenis o practicar otros deportes de equipo. · Limite el alcohol a 2 bebidas al día para los hombres y 1 bebida al día para las mujeres. · Coma muchas frutas, verduras y productos lácteos bajos en grasa. Coma menos grasas saturadas y grasas totales. ¿Cómo se trata la presión arterial tatiana? · Magaña médico le sugerirá hacer cambios en el estilo de nancy. Por ejemplo, es posible que magaña médico le pida que coma alimentos saludables, que deje de fumar, que baje de peso y que sea [place]. · Si los cambios de estilo de nancy no ayudan lo suficiente o magaña presión arterial es muy tatiana, tendrá que tl [employer] La atención de seguimiento es igor parte clave de magaña tratamiento y seguridad. Asegúrese de hacer y acudir a todas las citas, y llame a magaña médico si está teniendo problemas. También es igor buena idea saber los resultados de rafat exámenes y mantener igor lista de los medicamentos que james. ¿Dónde puede encontrar más información en inglés? Claire Spina a http://trista-katrin.info/. Escriba P501 en la búsqueda para aprender más acerca de \"Aprenda sobre la presión arterial tatiana - [ Learning About High Blood Pressure ]. \"  Revisado: 6 lisandrombkian, 2017  Versión del contenido: 11.7  © 2153-0256 Healthwise, Incorporated. Las instrucciones de cuidado fueron adaptadas bajo licencia por [employer] (which disclaims liability or warranty for this information). Si usted tiene [place] afección médica o sobre estas instrucciones, siempre pregunte a magaña profesional de lynda. Healthwise, Incorporated niega toda garantía o responsabilidad por magaña uso de esta información.

## 2018-10-01 NOTE — ED TRIAGE NOTES
Pt sent here by PCP for HTN and right leg pain, pt states she ran out of her BP medication. BP elevated in triage (195/116). +HA. Denies CP or SOB.

## 2018-10-01 NOTE — PATIENT INSTRUCTIONS
Acute High Blood Pressure: Care Instructions  Your Care Instructions    Acute high blood pressure is very high blood pressure. It's a serious problem. Very high blood pressure can damage your brain, heart, eyes, and kidneys. You may have been given medicines to lower your blood pressure. You may have gotten them as pills or through a needle in one of your veins. This is called an IV. And maybe you were given other medicines too. These can be needed when high blood pressure causes other problems. To keep your blood pressure at a lower level, you may need to make healthy lifestyle changes. And you will probably need to take medicines. Be sure to follow up with your doctor about your blood pressure and what you can do about it. Follow-up care is a key part of your treatment and safety. Be sure to make and go to all appointments, and call your doctor if you are having problems. It's also a good idea to know your test results and keep a list of the medicines you take. How can you care for yourself at home? · See your doctor as often as he or she recommends. This is to make sure your blood pressure is under control. You will probably go at least 2 times a year. But it may be more often at first.  · Take your blood pressure medicine exactly as prescribed. You may take one or more types. They include diuretics, beta-blockers, ACE inhibitors, calcium channel blockers, and angiotensin II receptor blockers. Call your doctor if you think you are having a problem with your medicine. · If you take blood pressure medicine, talk to your doctor before you take decongestants or anti-inflammatory medicine, such as ibuprofen. These can raise blood pressure. · Learn how to check your blood pressure at home. Check it often. · Ask your doctor if it's okay to drink alcohol. · Talk to your doctor about lifestyle changes that can help blood pressure. These include being active and not smoking. When should you call for help?   Call 911 anytime you think you may need emergency care. This may mean having symptoms that suggest that your blood pressure is causing a serious heart or blood vessel problem. Your blood pressure may be over 180/110.   For example, call 911 if:     · You have symptoms of a heart attack. These may include:  ¨ Chest pain or pressure, or a strange feeling in the chest.  ¨ Sweating. ¨ Shortness of breath. ¨ Nausea or vomiting. ¨ Pain, pressure, or a strange feeling in the back, neck, jaw, or upper belly or in one or both shoulders or arms. ¨ Lightheadedness or sudden weakness. ¨ A fast or irregular heartbeat.      · You have symptoms of a stroke. These may include:  ¨ Sudden numbness, tingling, weakness, or loss of movement in your face, arm, or leg, especially on only one side of your body. ¨ Sudden vision changes. ¨ Sudden trouble speaking. ¨ Sudden confusion or trouble understanding simple statements. ¨ Sudden problems with walking or balance. ¨ A sudden, severe headache that is different from past headaches.      · You have severe back or belly pain.    Do not wait until your blood pressure comes down on its own. Get help right away.   Call your doctor now or seek immediate care if:     · Your blood pressure is much higher than normal (such as 180/110 or higher), but you don't have symptoms.      · You think high blood pressure is causing symptoms, such as:  ¨ Severe headache. ¨ Blurry vision.    Watch closely for changes in your health, and be sure to contact your doctor if:     · Your blood pressure measures 140/90 or higher at least 2 times. That means the top number is 140 or higher or the bottom number is 90 or higher, or both.      · You think you may be having side effects from your blood pressure medicine.      · Your blood pressure is usually normal, but it goes above normal at least 2 times. Where can you learn more? Go to http://trista-katrin.info/.   Enter K548 in the search box to learn more about \"Acute High Blood Pressure: Care Instructions. \"  Current as of: May 10, 2017  Content Version: 11.7  © 1196-0368 [employer]. Care instructions adapted under license by [employer] (which disclaims liability or warranty for this information).  If you have questions about a medical condition or this instruction, always ask your healthcare professional. [address] any warranty or liability for your use of this information.

## 2018-10-01 NOTE — MR AVS SNAPSHOT
[address] 
[phone] Patient: Jada Hernandez MRN: [id number] MARCELO:[birth year] Visit Information Alyse Monsalve Personal Médico Departamento Teléfono del Dep. Número de visita 10/1/2018  9:45 AM Aziaz Jovani DO [address] [facility] [phone] [id number] Upcoming Health Maintenance Date Due DTaP/Tdap/Td series (1 - Tdap) 5/4/2004 PAP AKA CERVICAL CYTOLOGY 5/4/2004 Influenza Age 5 to Adult 8/1/2018 [place]  Review Complete El: 1/6/2018 Por: Ramiro Allen RN  
 A partir del:  10/1/2018 No Known Allergies Vacunas actuales Michelle Burton No hay ninguna vacuna archivada. No revisadas esta visita Partes vitales PS Pulso Temperatura Resp Waldron ( percentil de crecimiento) Peso (percentil de crecimiento) (!) 179/129 (BP 1 Location: Right arm, BP Patient Position: Sitting) 65 98.1 °F (36.7 °C) (Oral) 20 5' 3\" (1.6 m) 125 lb (56.7 kg) LMP (última sue) SpO2 BMI (IMC) Estado obstétrico Estatus de tabaquísmo 09/21/2018 100% 22.14 kg/m2 Having regular periods Never Smoker Historial de signos vitales BMI and BSA Data Body Mass Index Body Surface Area  
 22.14 kg/m 2 1.59 m 2 [place]otom[place]or lista de medicamentos actualizada Curtis Horton actualizada 10/1/18 10:51 AM.  Consuelo Roberts use sotomayor lista de medicamentos más reciente. amLODIPine 5 mg tablet También conocido duarte:  Andrei Limmann Take 1 Tab by mouth daily. butalbital-acetaminophen-caff -40 mg per capsule También conocido duarte:  FIORICET Take 1 Cap by mouth every six (6) hours as needed for Pain. Max Daily Amount: 4 Caps. fluticasone 50 mcg/actuation nasal spray También conocido duarte:  FLONASE 2 Sprays by Both Nostrils route daily. lisinopril 10 mg tablet También conocido duarte:  Jett Harper Take 1 Tab by mouth daily. ondansetron 8 mg disintegrating tablet Jaden valle duarte:  ZOFRAN ODT Take 1 Tab by mouth every eight (8) hours as needed for Nausea. prenatal vit-iron fumarate-fa 27 mg iron- 0.8 mg Tab tablet Take 1 Tab by mouth daily. Instrucciones para el Paciente Acute High Blood Pressure: Care Instructions Your Care Instructions Acute high blood pressure is very high blood pressure. It's a serious problem. Very high blood pressure can damage your brain, heart, eyes, and kidneys. You may have been given medicines to lower your blood pressure. You may have gotten them as pills or through a needle in one of your veins. This is called an IV. And maybe you were given other medicines too. These can be needed when high blood pressure causes other problems. To keep your blood pressure at a lower level, you may need to make healthy lifestyle changes. And you will probably need to take medicines. Be sure to follow up with your doctor about your blood pressure and what you can do about it. Follow-up care is a key part of your treatment and safety. Be sure to make and go to all appointments, and call your doctor if you are having problems. It's also a good idea to know your test results and keep a list of the medicines you take. How can you care for yourself at home? · See your doctor as often as he or she recommends. This is to make sure your blood pressure is under control. You will probably go at least 2 times a year. But it may be more often at first. 
· Take your blood pressure medicine exactly as prescribed. You may take one or more types. They include diuretics, beta-blockers, ACE inhibitors, calcium channel blockers, and angiotensin II receptor blockers. Call your doctor if you think you are having a problem with your medicine. · If you take blood pressure medicine, talk to your doctor before you take decongestants or anti-inflammatory medicine, such as ibuprofen.  These can raise blood pressure. · Learn how to check your blood pressure at home. Check it often. · Ask your doctor if it's okay to drink alcohol. · Talk to your doctor about lifestyle changes that can help blood pressure. These include being active and not smoking. When should you call for help? Call 911 anytime you think you may need emergency care. This may mean having symptoms that suggest that your blood pressure is causing a serious heart or blood vessel problem. Your blood pressure may be over 180/110. 
 For example, call 911 if: 
   · You have symptoms of a heart attack. These may include: ¨ Chest pain or pressure, or a strange feeling in the chest. 
¨ Sweating. ¨ Shortness of breath. ¨ Nausea or vomiting. ¨ Pain, pressure, or a strange feeling in the back, neck, jaw, or upper belly or in one or both shoulders or arms. ¨ Lightheadedness or sudden weakness. ¨ A fast or irregular heartbeat.  
   · You have symptoms of a stroke. These may include: 
¨ Sudden numbness, tingling, weakness, or loss of movement in your face, arm, or leg, especially on only one side of your body. ¨ Sudden vision changes. ¨ Sudden trouble speaking. ¨ Sudden confusion or trouble understanding simple statements. ¨ Sudden problems with walking or balance. ¨ A sudden, severe headache that is different from past headaches.  
   · You have severe back or belly pain.  
 Do not wait until your blood pressure comes down on its own. Get help right away. 
 Call your doctor now or seek immediate care if: 
   · Your blood pressure is much higher than normal (such as 180/110 or higher), but you don't have symptoms.  
   · You think high blood pressure is causing symptoms, such as: ¨ Severe headache. ¨ Blurry vision.  
 Watch closely for changes in your health, and be sure to contact your doctor if: 
   · Your blood pressure measures 140/90 or higher at least 2 times.  That means the top number is 140 or higher or the bottom number is 90 or higher, or both.  
   · You think you may be having side effects from your blood pressure medicine.  
   · Your blood pressure is usually normal, but it goes above normal at least 2 times. Where can you learn more? Go to http://trista-katrin.info/. Enter B120 in the search box to learn more about \"Acute High Blood Pressure: Care Instructions. \" Current as of: May 10, 2017 Content Version: 11.7 © 7387-3862 [employer]. Care instructions adapted under license by [employer] (which disclaims liability or warranty for this information). If you have questions about a medical condition or this instruction, always ask your healthcare professional. [place] [address] any warranty or liability for your use of this information. 
  
 
 
 
  
Introducing [facility]! [facility] introduce portal paciente [employer] . Ahora se puede acceder a partes de magaña expediente médico, enviar por correo electrónico la oficina de magaña médico y solicitar renovaciones de medicamentos en línea. En magaña navegador de Internet , Erlin Reveal a https://[employer]. MySQUAR/[employer] Kaya clic en el usuario por Roxine Click? Marietta Dong uriarte aquí en la sesión [place] Vidales. Verá la página de registro Lynch. Ingrese magaña código de [facility] Mehnaz radha y duarte aparece a continuación. Usted no tendrá que [employer] código después de angelika completado el proceso de registro . Si usted no se inscribe antes de la fecha de caducidad , debe solicitar un nuevo código. · MyChart Código de acceso : [id number] Expires: 12/30/2018 10:51 AM 
 
Ingresa los últimos cuatro dígitos de magaña Número de Seguro Social ( xxxx ) y fecha de nacimiento ( dd / mm / aaaa ) duarte se indica y kaya clic en Enviar. Usted será llevado a la siguiente página de registro . Crear un ID MyChart . Esta será magaña ID de inicio de sesión de MyChart y no puede ser [place] , por lo que pensar en igor que es Beka Buster y fácil de recordar . Crear igor contraseña MyChart . Usted puede cambiar magaña contraseña en cualquier momento . Ingrese magaña Password Reset de preguntas y Millan . [place] se puede utilizar en un momento posterior si usted olvida magaña contraseña. Introduzca magaña dirección de correo electrónico . Huma Barry recibirá igor notificación por correo electrónico cuando la nueva información está disponible en MyChart . Matthieu Padtiny clic en Registrarse. Mikel Lamer jr y descargar porciones de magaña expediente médico. 
Alesia clic en el enlace de descarga del menú Resumen para descargar igor copia portátil de magaña información médica . Si tiene [employer] , por favor visite la sección de preguntas frecuentes del sitio web MyChart . Recuerde, MyChart NO es que se utilizará para las necesidades urgentes. Para emergencias médicas , llame al 911 . Ahora disponible en magaña iPhone y Android ! Por favor proporcione amarjit resumen de la documentación de cuidado a magaña próximo proveedor. Your primary care clinician is listed as NONE. If you have any questions after today's visit, please call [phone].

## 2018-10-23 ENCOUNTER — OFFICE VISIT (OUTPATIENT)
Dept: FAMILY MEDICINE CLINIC | Age: 33
End: 2018-10-23

## 2018-10-23 VITALS
DIASTOLIC BLOOD PRESSURE: 83 MMHG | BODY MASS INDEX: 22.15 KG/M2 | RESPIRATION RATE: 20 BRPM | OXYGEN SATURATION: 100 % | HEIGHT: 63 IN | SYSTOLIC BLOOD PRESSURE: 132 MMHG | TEMPERATURE: 98.4 F | WEIGHT: 125 LBS | HEART RATE: 64 BPM

## 2018-10-23 DIAGNOSIS — Z28.21 IMMUNIZATION NOT CARRIED OUT BECAUSE OF PATIENT REFUSAL: ICD-10-CM

## 2018-10-23 DIAGNOSIS — I10 ESSENTIAL HYPERTENSION: Primary | ICD-10-CM

## 2018-10-23 DIAGNOSIS — R25.2 LEG CRAMPING: ICD-10-CM

## 2018-10-23 DIAGNOSIS — E87.6 HYPOKALEMIA: ICD-10-CM

## 2018-10-23 NOTE — PROGRESS NOTES
HPI     Chief Complaint   Patient presents with    Blood Pressure Check     She is a 26 y.o. female who presents for HTN, leg pain. HTN  - Sent to ED 10/1/18 for concern for hypertensive urgency  - BP much improved today  - Has been taking meds as prescibed  - Denies HA, blurred vision, RUQ pain    Leg Pain  - States she gets cramping in her calves and thighs   - Worse at night and after working on her feet  - LE Doppler 10/1 neg BL for DVT  - Has tried creams like Bengay and Icyhot with some relief    Social Hx: Has not had flu shot    Review of Systems   Eyes: Negative for visual disturbance. Cardiovascular: Negative for leg swelling. Gastrointestinal: Negative for abdominal pain. Neurological: Negative for headaches. Reviewed PmHx, RxHx, FmHx, SocHx, AllgHx and updated and dated in the chart. Physical Exam:  Visit Vitals  /83 (BP 1 Location: Right arm, BP Patient Position: Sitting)   Pulse 64   Temp 98.4 °F (36.9 °C) (Oral)   Resp 20   Ht 5' 3\" (1.6 m)   Wt 125 lb (56.7 kg)   SpO2 100%   BMI 22.14 kg/m²     Physical Exam   Constitutional: She appears well-developed and well-nourished. No distress. Cardiovascular: Normal rate, regular rhythm and normal heart sounds. Exam reveals no gallop and no friction rub. No murmur heard. Pulmonary/Chest: Effort normal and breath sounds normal. No respiratory distress. She has no wheezes. She has no rales. Musculoskeletal: Normal range of motion. She exhibits no edema or tenderness. Monofilament testing WNL   Skin: She is not diaphoretic. Nursing note and vitals reviewed. No results found for this or any previous visit (from the past 12 hour(s)).         Assessment / Plan     HTN  - Continue Lisinopril, Norvasc  - BP stable today    Leg Pain  - Mg today  - BL LE Doppler neg for DVT 10/1    Hypokalemia  - K 3.4 10/1  - Repeat BMP, will replete if still low    Declined flu shot today    Follow up PRN    I have discussed the diagnosis with the patient and the intended plan as seen in the above orders. The patient has received an after-visit summary and questions were answered concerning future plans. I have discussed medication side effects and warnings with the patient as well.     Patient discussed with Dr. Tamika Stewart MD (Attending)    García Valdez DO  Family Medicine Resident

## 2018-10-23 NOTE — PATIENT INSTRUCTIONS
Calambres musculares: Instrucciones de cuidado - [ Muscle Cramps: Care Instructions ]  Instrucciones de cuidado    Un calambre muscular ocurre cuando un músculo se contrae de manera repentina. Un calambre a menudo se produce en las piernas. Al calambre muscular también se lo conoce duarte espasmo muscular o en inglés duarte \"charley horse\". Los calambres musculares generalmente ballard menos de un minuto. Sin embargo, el dolor que provocan podría durar varios minutos. Los calambres nocturnos pueden despertarlo. El ejercicio intenso, la deshidratación y el sobrepeso pueden aumentar el riesgo de tener calambres. El desequilibrio de ciertas sustancias químicas en la katherine, llamadas electrolitos, también puede provocarlos. A veces, a las mujeres embarazadas les da calambres mientras duermen. Los calambres musculares se pueden tratar al masajear y estirar el músculo. Si continúan, magaña médico podría recetarle medicamentos para relajar los músculos. La atención de seguimiento es igor parte clave de magaña tratamiento y seguridad. Asegúrese de hacer y acudir a todas las citas, y llame a magaña médico si está teniendo problemas. También es igor buena idea saber los resultados de rafat exámenes y mantener igor lista de los medicamentos que james. ¿Cómo puede cuidarse en el hogar? · Della abundantes líquidos para prevenir la deshidratación. Elija beber agua y otros líquidos josselyn sin cafeína hasta que se sienta mejor. Si tiene [employer], del corazón o del hígado y tiene que [place]'s líquidos, hable con magaña médico antes de aumentar magaña consumo. · [employer] músculos todos los días, en especial antes y después de hacer ejercicio, y a la hora de [place]. Los ejercicios regulares de estiramiento pueden [employer] músculos y podrían prevenir los calambres. · No aumente la cantidad de ejercicio que hace de manera repentina. Aumente rafat ejercicios poco a poco, cada semana.   · Cuando tenga un calambre, dom y [place] músculo. Asimismo, podría darse igor ducha o un baño de agua tibia para relajar los músculos. Colocar igor almohadilla térmica en el músculo también le puede servir de [employer]. · Gypsy un suplemento multivitamínico todos los días. · Pregúntele a magaña médico si puede tl un analgésico (medicamento para el dolor) de venta brody, duarte acetaminofén (Tylenol), ibuprofeno (Advil, Motrin) o naproxeno (Aleve). Sea robert con los medicamentos. Antonette y siga todas las instrucciones de la [place]. ¿Cuándo debe pedir ayuda? Preste especial atención a los cambios en magaña lynda y asegúrese de comunicarse con magaña médico si:    · Tiene calambres musculares con frecuencia que no desaparecen después del tratamiento en el hogar.     · Los calambres musculares lo despiertan a menudo por las noches.     · No mejora duarte se esperaba. ¿Dónde puede encontrar más información en inglés? Byron Huang a http://trista-katrin.info/. Curtis Barger B202 en la búsqueda para aprender más acerca de \"Calambres musculares: Instrucciones de cuidado - [ Muscle Cramps: Care Instructions ]. \"  Revisado: 29 noviembre, 2017  Versión del contenido: 11.8  © 6763-6312 Healthwise, Incorporated. Las instrucciones de cuidado fueron adaptadas bajo licencia por [employer] (which disclaims liability or warranty for this information). Si usted tiene [place] afección médica o sobre estas instrucciones, siempre pregunte a magaña profesional de lynda. Healthwise, Incorporated niega toda garantía o responsabilidad por magaña uso de esta información.        Calambres musculares: Instrucciones de cuidado - [ Muscle Cramps: Care Instructions ]  Instrucciones de cuidado    Un calambre muscular ocurre cuando un músculo se contrae de manera repentina. Un calambre a menudo se produce en las piernas. Al calambre muscular también se lo conoce duarte espasmo muscular o en inglés duarte \"charley horse\". Los calambres musculares generalmente ballard menos de un minuto. Sin embargo, el dolor que provocan podría durar varios minutos. Los calambres nocturnos pueden despertarlo. El ejercicio intenso, la deshidratación y el sobrepeso pueden aumentar el riesgo de tener calambres. El desequilibrio de ciertas sustancias químicas en la katherine, llamadas electrolitos, también puede provocarlos. A veces, a las mujeres embarazadas les da calambres mientras duermen. Los calambres musculares se pueden tratar al masajear y estirar el músculo. Si continúan, magaña médico podría recetarle medicamentos para relajar los músculos. La atención de seguimiento es igor parte clave de magaña tratamiento y seguridad. Asegúrese de hacer y acudir a todas las citas, y llame a magaña médico si está teniendo problemas. También es igor buena idea saber los resultados de rafat exámenes y mantener igor lista de los medicamentos que james. ¿Cómo puede cuidarse en el hogar? · Della abundantes líquidos para prevenir la deshidratación. Elija beber agua y otros líquidos josselyn sin cafeína hasta que se sienta mejor. Si tiene [employer], del corazón o del hígado y tiene que [place]'s líquidos, hable con magaña médico antes de aumentar magaña consumo. · [employer] músculos todos los días, en especial antes y después de hacer ejercicio, y a la hora de [place]. Los ejercicios regulares de estiramiento pueden [employer] músculos y podrían prevenir los calambres. · No aumente la cantidad de ejercicio que hace de manera repentina. Aumente rafat ejercicios poco a poco, cada semana. · Cuando tenga un calamb, [facility] y [employer]. Asimismo, podría darse igor ducha o un baño de agua tibia para relajar los músculos. Colocar igor almohadilla térmica en el músculo también le puede servir de [place]. · [place] un suplemento multivitamínico todos los días. · Pregúntele a magaña médico si puede tl un analgésico (medicamento para el dolor) de venta brody, duarte acetaminofén (Tylenol), ibuprofeno (Advil, Motrin) o naproxeno (Aleve). Sea robert con los medicamentos. Antonette y siga todas las instrucciones de la [place]. ¿Cuándo debe pedir ayuda? Preste especial atención a los cambios en magaña lynda y asegúrese de comunicarse con magaña médico si:    · Tiene calambres musculares con frecuencia que no desaparecen después del tratamiento en el hogar.     · Los calambres musculares lo despiertan a menudo por las noches.     · No mejora duarte se esperaba. ¿Dónde puede encontrar más información en inglés? Solange Garcia a http://trista-katrin.info/. Arnol Seip C819 en la búsqueda para aprender más acerca de \"Calambres musculares: Instrucciones de cuidado - [ Muscle Cramps: Care Instructions ]. \"  Revisado: 29 noviembre, 2017  Versión del contenido: 11.8  © 0819-9128 Healthwise, Incorporated. Las instrucciones de cuidado fueron adaptadas bajo licencia por [employer] (which disclaims liability or warranty for this information). Si usted tiene [place] afección médica o sobre estas instrucciones, siempre pregunte a magaña profesional de lynda. Healthwise, Incorporated niega toda garantía o responsabilidad por magaña uso de esta información.         Shin Splints (Shin Pain): Exercises  Your Care Instructions  Here are some examples of typical rehabilitation exercises for your condition. Start each exercise slowly. Ease off the exercise if you start to have pain. Your doctor or physical therapist will tell you when you can start these exercises and which ones will work best for you. How to do the exercises  Calf wall stretch (back knee straight)    1. Stand facing a wall with your hands on the wall at about eye level. Put your affected leg about a step behind your other leg. 2. Keeping your back leg straight and your back heel on the floor, bend your front knee and gently bring your hip and chest toward the wall until you feel a stretch in the calf of your back leg. 3. Hold the stretch for at least 15 to 30 seconds.   4. Repeat 2 to 4 times.     Calf wall stretch (knees bent)    1. Stand facing a wall with your hands on the wall at about eye level. Put your affected leg about a step behind your other leg. 2. Keeping both heels on the floor, bend both knees. Then gently bring your hip and chest toward the wall until you feel a stretch in the calf of your back leg. 3. Hold the stretch for at least 15 to 30 seconds. 4. Repeat 2 to 4 times.     Hamstring wall stretch    1. Lie on your back in a doorway, with your good leg through the open door. 2. Slide your affected leg up the wall to straighten your knee. You should feel a gentle stretch down the back of your leg. 1. Do not arch your back. 2. Do not bend either knee. 3. Keep one heel touching the floor and the other heel touching the wall. Do not point your toes. 3. Hold the stretch for at least 1 minute to begin. Then over time, try to lengthen the time you hold the stretch to as long as 6 minutes. 4. Repeat 2 to 4 times. 5. If you do not have a place to do this exercise in a doorway, there is another way to do it:  6. Lie on your back, and bend the knee of your affected leg. 7. Loop a towel under the ball and toes of that foot, and hold the ends of the towel in your hands. 8. Straighten your knee, and slowly pull back on the towel. You should feel a gentle stretch down the back of your leg. 9. Hold the stretch for 15 to 30 seconds. Or even better, hold the stretch for 1 minute if you can. 10. Repeat 2 to 4 times.     Shin muscle stretch    1. Sit in a chair, with both feet flat on the floor. 2. Bend your affected leg behind you so that the top of your foot near your toes is flat on the floor and your toes are pointed away from your body. If you need to, you can hold on to the sides of the chair for support. 3. Hold the stretch for at least 15 to 30 seconds. You should feel a stretch in the front (shin) of your lower leg.   4. Repeat 2 to 4 times.     Follow-up care is a key part of your treatment and safety. Be sure to make and go to all appointments, and call your doctor if you are having problems. It's also a good idea to know your test results and keep a list of the medicines you take. Where can you learn more? Go to http://trista-katrin.info/. Enter Z822 in the search box to learn more about \"Shin Splints (Shin Pain): Exercises. \"  Current as of: November 29, 2017  Content Version: 11.8  © 3344-8690 Healthwise, Incorporated. Care instructions adapted under license by [employer] (which disclaims liability or warranty for this information).  If you have questions about a medical condition or this instruction, always ask your healthcare professional. [address] any warranty or liability for your use of this information.

## 2018-10-23 NOTE — PROGRESS NOTES
Chief Complaint   Patient presents with    Blood Pressure Check     1. Have you been to the ER, urgent care clinic since your last visit? Hospitalized since your last visit? Yes When: 10/1/18,,elevated blood pressure    2. Have you seen or consulted any other health care providers outside of the [facility] since your last visit? Include any pap smears or colon screening.  No

## 2018-10-24 LAB
BUN SERPL-MCNC: 16 MG/DL (ref 7–25)
BUN/CREATININE RATIO,BUCR: NORMAL (CALC) (ref 6–22)
CALCIUM SERPL-MCNC: 9.2 MG/DL (ref 8.6–10.2)
CHLORIDE SERPL-SCNC: 103 MMOL/L (ref 98–110)
CO2 SERPL-SCNC: 28 MMOL/L (ref 20–32)
CREAT SERPL-MCNC: 0.84 MG/DL (ref 0.5–1.1)
GLUCOSE SERPL-MCNC: 92 MG/DL (ref 65–99)
MAGNESIUM SERPL-MCNC: 2.1 MG/DL (ref 1.5–2.5)
POTASSIUM SERPL-SCNC: 4.7 MMOL/L (ref 3.5–5.3)
SODIUM SERPL-SCNC: 136 MMOL/L (ref 135–146)

## 2018-10-29 ENCOUNTER — TELEPHONE (OUTPATIENT)
Dept: FAMILY MEDICINE CLINIC | Age: 33
End: 2018-10-29

## 2018-10-29 NOTE — TELEPHONE ENCOUNTER
----- Message from Aidee Burnham sent at 10/29/2018  3:52 PM EDT -----  Regarding: Dr. Refugio Disla  Pt's , Kamila Alvarado, is inquiring about the Rx that was suppose to be entered in by the PCP last week but the pharmacy did not receive it yet. The pharmacy she uses [facility] @ [address], [address] [place] [address]. Best contact number is [phone].

## 2018-10-30 RX ORDER — LISINOPRIL 10 MG/1
10 TABLET ORAL DAILY
Qty: 30 TAB | Refills: 2 | Status: SHIPPED | OUTPATIENT
Start: 2018-10-30

## 2018-10-30 RX ORDER — AMLODIPINE BESYLATE 5 MG/1
5 TABLET ORAL DAILY
Qty: 30 TAB | Refills: 2 | Status: SHIPPED | OUTPATIENT
Start: 2018-10-30

## 2018-10-30 NOTE — TELEPHONE ENCOUNTER
10/30/2018 10:02 AM    Have sent Rx for Lisinopril and Norvasc to pharmacy on [facility].     Joel Jones DO  Family Med Resident, PGY2

## 2018-11-12 NOTE — TELEPHONE ENCOUNTER
[phone]  , Mr. Siddharth Kimball, called to say the pharmacy still not have these medications on file and he wants this addressed now. He was asked to hold and then there was a phone disconnect. I spoke with Marquita Butler, practice supervisor, who called the pharmacy and was told that these medications were on the shelf for 9 days (never picked up) and after that they had to restock the medications. The pharmacy said at this time that they would be filled again. I called and spoke with  and relayed this information.

## 2018-11-12 NOTE — TELEPHONE ENCOUNTER
I called [facility] on [address] (this is pharmacy on file. They stated that the prescriptions were received, but they only hold for 9 days and them it is returned to the shelf. They will refill today for the patient.

## 2021-03-04 ENCOUNTER — TELEPHONE (OUTPATIENT)
Dept: FAMILY MEDICINE CLINIC | Age: 36
End: 2021-03-04

## 2021-11-23 ENCOUNTER — APPOINTMENT (RX ONLY)
Dept: URBAN - METROPOLITAN AREA CLINIC 77 | Facility: CLINIC | Age: 36
Setting detail: DERMATOLOGY
End: 2021-11-23

## 2021-11-23 ENCOUNTER — APPOINTMENT (OUTPATIENT)
Age: 36
Setting detail: DERMATOLOGY
End: 2021-11-23

## 2021-11-23 ENCOUNTER — APPOINTMENT (RX ONLY)
Dept: URBAN - METROPOLITAN AREA CLINIC 168 | Facility: CLINIC | Age: 36
Setting detail: DERMATOLOGY
End: 2021-11-23

## 2021-11-23 DIAGNOSIS — L57.8 OTHER SKIN CHANGES DUE TO CHRONIC EXPOSURE TO NONIONIZING RADIATION: ICD-10-CM

## 2021-11-23 DIAGNOSIS — L81.0 POSTINFLAMMATORY HYPERPIGMENTATION: ICD-10-CM

## 2021-11-23 PROCEDURE — ? PRESCRIPTION

## 2021-11-23 PROCEDURE — ? PRESCRIPTION MEDICATION MANAGEMENT

## 2021-11-23 PROCEDURE — 99203 OFFICE O/P NEW LOW 30 MIN: CPT

## 2021-11-23 PROCEDURE — ? COUNSELING

## 2021-11-23 RX ORDER — HYDROQUINONE 4 %
THIN LAYER CREAM (GRAM) TOPICAL BID
Qty: 1 | Refills: 0 | Status: ERX | COMMUNITY
Start: 2021-11-23

## 2021-11-23 RX ADMIN — Medication THIN LAYER: at 00:00

## 2021-11-23 ASSESSMENT — LOCATION ZONE DERM
LOCATION ZONE: FACE

## 2021-11-23 ASSESSMENT — LOCATION DETAILED DESCRIPTION DERM
LOCATION DETAILED: RIGHT LATERAL MALAR CHEEK

## 2021-11-23 ASSESSMENT — LOCATION SIMPLE DESCRIPTION DERM
LOCATION SIMPLE: RIGHT CHEEK

## 2021-11-23 NOTE — PROCEDURE: PRESCRIPTION MEDICATION MANAGEMENT
Render In Strict Bullet Format?: No
Initiate Treatment: hydroquinone 4 % topical cream- Apply thin layer spot treating on AA BID x 2 months then stop, repeat if needed
Detail Level: Simple

## 2023-05-20 RX ORDER — LISINOPRIL 10 MG/1
10 TABLET ORAL DAILY
COMMUNITY
Start: 2018-10-30

## 2023-05-20 RX ORDER — ONDANSETRON 8 MG/1
8 TABLET, ORALLY DISINTEGRATING ORAL EVERY 8 HOURS PRN
COMMUNITY
Start: 2018-01-06

## 2023-05-20 RX ORDER — AMLODIPINE BESYLATE 5 MG/1
5 TABLET ORAL DAILY
COMMUNITY
Start: 2018-10-30

## 2023-05-20 RX ORDER — BUTALBITAL, ACETAMINOPHEN AND CAFFEINE 300; 40; 50 MG/1; MG/1; MG/1
1 CAPSULE ORAL EVERY 6 HOURS PRN
COMMUNITY
Start: 2017-08-01

## 2023-05-20 RX ORDER — FLUTICASONE PROPIONATE 50 MCG
2 SPRAY, SUSPENSION (ML) NASAL DAILY
COMMUNITY
Start: 2017-08-01